# Patient Record
Sex: FEMALE | Race: WHITE | Employment: OTHER | URBAN - METROPOLITAN AREA
[De-identification: names, ages, dates, MRNs, and addresses within clinical notes are randomized per-mention and may not be internally consistent; named-entity substitution may affect disease eponyms.]

---

## 2018-02-16 ENCOUNTER — ANESTHESIA (OUTPATIENT)
Dept: ENDOSCOPY | Age: 83
End: 2018-02-16
Payer: MEDICARE

## 2018-02-16 ENCOUNTER — APPOINTMENT (OUTPATIENT)
Dept: GENERAL RADIOLOGY | Age: 83
End: 2018-02-16
Attending: EMERGENCY MEDICINE
Payer: MEDICARE

## 2018-02-16 ENCOUNTER — APPOINTMENT (OUTPATIENT)
Dept: CT IMAGING | Age: 83
End: 2018-02-16
Attending: EMERGENCY MEDICINE
Payer: MEDICARE

## 2018-02-16 ENCOUNTER — APPOINTMENT (OUTPATIENT)
Dept: GENERAL RADIOLOGY | Age: 83
End: 2018-02-16
Attending: INTERNAL MEDICINE
Payer: MEDICARE

## 2018-02-16 ENCOUNTER — ANESTHESIA EVENT (OUTPATIENT)
Dept: ENDOSCOPY | Age: 83
End: 2018-02-16
Payer: MEDICARE

## 2018-02-16 ENCOUNTER — HOSPITAL ENCOUNTER (OUTPATIENT)
Age: 83
Setting detail: OBSERVATION
Discharge: HOME | End: 2018-02-17
Attending: EMERGENCY MEDICINE | Admitting: INTERNAL MEDICINE
Payer: MEDICARE

## 2018-02-16 DIAGNOSIS — K56.2 SIGMOID VOLVULUS (HCC): Primary | ICD-10-CM

## 2018-02-16 LAB
ALBUMIN SERPL-MCNC: 3.5 G/DL (ref 3.2–4.6)
ALBUMIN/GLOB SERPL: 0.8 {RATIO} (ref 1.2–3.5)
ALP SERPL-CCNC: 73 U/L (ref 50–136)
ALT SERPL-CCNC: 34 U/L (ref 12–65)
ANION GAP SERPL CALC-SCNC: 9 MMOL/L (ref 7–16)
AST SERPL-CCNC: 42 U/L (ref 15–37)
ATRIAL RATE: 79 BPM
BASOPHILS # BLD: 0.1 K/UL (ref 0–0.2)
BASOPHILS NFR BLD: 1 % (ref 0–2)
BILIRUB SERPL-MCNC: 0.5 MG/DL (ref 0.2–1.1)
BUN SERPL-MCNC: 22 MG/DL (ref 8–23)
CALCIUM SERPL-MCNC: 9.2 MG/DL (ref 8.3–10.4)
CALCULATED P AXIS, ECG09: 60 DEGREES
CALCULATED R AXIS, ECG10: 51 DEGREES
CALCULATED T AXIS, ECG11: 12 DEGREES
CHLORIDE SERPL-SCNC: 103 MMOL/L (ref 98–107)
CO2 SERPL-SCNC: 26 MMOL/L (ref 21–32)
CREAT SERPL-MCNC: 0.9 MG/DL (ref 0.6–1)
DIAGNOSIS, 93000: NORMAL
DIFFERENTIAL METHOD BLD: ABNORMAL
EOSINOPHIL # BLD: 0.2 K/UL (ref 0–0.8)
EOSINOPHIL NFR BLD: 2 % (ref 0.5–7.8)
ERYTHROCYTE [DISTWIDTH] IN BLOOD BY AUTOMATED COUNT: 14.8 % (ref 11.9–14.6)
GLOBULIN SER CALC-MCNC: 4.3 G/DL (ref 2.3–3.5)
GLUCOSE SERPL-MCNC: 104 MG/DL (ref 65–100)
HCT VFR BLD AUTO: 39.9 % (ref 35.8–46.3)
HGB BLD-MCNC: 13.6 G/DL (ref 11.7–15.4)
IMM GRANULOCYTES # BLD: 0 K/UL (ref 0–0.5)
IMM GRANULOCYTES NFR BLD AUTO: 0 % (ref 0–5)
LIPASE SERPL-CCNC: 78 U/L (ref 73–393)
LYMPHOCYTES # BLD: 1.6 K/UL (ref 0.5–4.6)
LYMPHOCYTES NFR BLD: 16 % (ref 13–44)
MCH RBC QN AUTO: 32.3 PG (ref 26.1–32.9)
MCHC RBC AUTO-ENTMCNC: 34.1 G/DL (ref 31.4–35)
MCV RBC AUTO: 94.8 FL (ref 79.6–97.8)
MONOCYTES # BLD: 1.2 K/UL (ref 0.1–1.3)
MONOCYTES NFR BLD: 12 % (ref 4–12)
NEUTS SEG # BLD: 6.8 K/UL (ref 1.7–8.2)
NEUTS SEG NFR BLD: 69 % (ref 43–78)
P-R INTERVAL, ECG05: 220 MS
PLATELET # BLD AUTO: 181 K/UL (ref 150–450)
PMV BLD AUTO: 10.5 FL (ref 10.8–14.1)
POTASSIUM SERPL-SCNC: 5.3 MMOL/L (ref 3.5–5.1)
PROT SERPL-MCNC: 7.8 G/DL (ref 6.3–8.2)
Q-T INTERVAL, ECG07: 398 MS
QRS DURATION, ECG06: 70 MS
QTC CALCULATION (BEZET), ECG08: 456 MS
RBC # BLD AUTO: 4.21 M/UL (ref 4.05–5.25)
SODIUM SERPL-SCNC: 138 MMOL/L (ref 136–145)
TROPONIN I BLD-MCNC: 0.01 NG/ML (ref 0.02–0.05)
VENTRICULAR RATE, ECG03: 79 BPM
WBC # BLD AUTO: 9.9 K/UL (ref 4.3–11.1)

## 2018-02-16 PROCEDURE — 74011250636 HC RX REV CODE- 250/636

## 2018-02-16 PROCEDURE — 96361 HYDRATE IV INFUSION ADD-ON: CPT | Performed by: EMERGENCY MEDICINE

## 2018-02-16 PROCEDURE — 99218 HC RM OBSERVATION: CPT

## 2018-02-16 PROCEDURE — 74011250637 HC RX REV CODE- 250/637: Performed by: INTERNAL MEDICINE

## 2018-02-16 PROCEDURE — 96374 THER/PROPH/DIAG INJ IV PUSH: CPT | Performed by: EMERGENCY MEDICINE

## 2018-02-16 PROCEDURE — 74011000258 HC RX REV CODE- 258: Performed by: EMERGENCY MEDICINE

## 2018-02-16 PROCEDURE — 83690 ASSAY OF LIPASE: CPT | Performed by: EMERGENCY MEDICINE

## 2018-02-16 PROCEDURE — 74011250636 HC RX REV CODE- 250/636: Performed by: EMERGENCY MEDICINE

## 2018-02-16 PROCEDURE — 74011250636 HC RX REV CODE- 250/636: Performed by: INTERNAL MEDICINE

## 2018-02-16 PROCEDURE — 74177 CT ABD & PELVIS W/CONTRAST: CPT

## 2018-02-16 PROCEDURE — 76040000025: Performed by: INTERNAL MEDICINE

## 2018-02-16 PROCEDURE — 74011636320 HC RX REV CODE- 636/320: Performed by: EMERGENCY MEDICINE

## 2018-02-16 PROCEDURE — 80053 COMPREHEN METABOLIC PANEL: CPT | Performed by: EMERGENCY MEDICINE

## 2018-02-16 PROCEDURE — 74011000250 HC RX REV CODE- 250

## 2018-02-16 PROCEDURE — 96372 THER/PROPH/DIAG INJ SC/IM: CPT

## 2018-02-16 PROCEDURE — 85025 COMPLETE CBC W/AUTO DIFF WBC: CPT | Performed by: EMERGENCY MEDICINE

## 2018-02-16 PROCEDURE — 99283 EMERGENCY DEPT VISIT LOW MDM: CPT | Performed by: EMERGENCY MEDICINE

## 2018-02-16 PROCEDURE — 76060000032 HC ANESTHESIA 0.5 TO 1 HR: Performed by: INTERNAL MEDICINE

## 2018-02-16 PROCEDURE — 96375 TX/PRO/DX INJ NEW DRUG ADDON: CPT | Performed by: EMERGENCY MEDICINE

## 2018-02-16 PROCEDURE — 93005 ELECTROCARDIOGRAM TRACING: CPT | Performed by: EMERGENCY MEDICINE

## 2018-02-16 PROCEDURE — 96361 HYDRATE IV INFUSION ADD-ON: CPT

## 2018-02-16 PROCEDURE — 99284 EMERGENCY DEPT VISIT MOD MDM: CPT | Performed by: EMERGENCY MEDICINE

## 2018-02-16 PROCEDURE — 84484 ASSAY OF TROPONIN QUANT: CPT

## 2018-02-16 PROCEDURE — 74018 RADEX ABDOMEN 1 VIEW: CPT

## 2018-02-16 RX ORDER — FAMOTIDINE 20 MG/1
20 TABLET, FILM COATED ORAL DAILY
Status: DISCONTINUED | OUTPATIENT
Start: 2018-02-17 | End: 2018-02-17 | Stop reason: HOSPADM

## 2018-02-16 RX ORDER — SODIUM CHLORIDE 0.9 % (FLUSH) 0.9 %
5-10 SYRINGE (ML) INJECTION EVERY 8 HOURS
Status: DISCONTINUED | OUTPATIENT
Start: 2018-02-16 | End: 2018-02-17 | Stop reason: HOSPADM

## 2018-02-16 RX ORDER — PRAVASTATIN SODIUM 20 MG/1
20 TABLET ORAL
Status: DISCONTINUED | OUTPATIENT
Start: 2018-02-16 | End: 2018-02-17 | Stop reason: HOSPADM

## 2018-02-16 RX ORDER — DOCUSATE SODIUM 100 MG/1
100 CAPSULE, LIQUID FILLED ORAL
Status: DISCONTINUED | OUTPATIENT
Start: 2018-02-16 | End: 2018-02-17 | Stop reason: HOSPADM

## 2018-02-16 RX ORDER — TRAMADOL HYDROCHLORIDE 50 MG/1
25 TABLET ORAL
Status: DISCONTINUED | OUTPATIENT
Start: 2018-02-16 | End: 2018-02-17 | Stop reason: HOSPADM

## 2018-02-16 RX ORDER — ONDANSETRON 4 MG/1
4 TABLET, FILM COATED ORAL
COMMUNITY
End: 2018-03-14

## 2018-02-16 RX ORDER — SODIUM CHLORIDE 0.9 % (FLUSH) 0.9 %
5-10 SYRINGE (ML) INJECTION AS NEEDED
Status: DISCONTINUED | OUTPATIENT
Start: 2018-02-16 | End: 2018-02-17 | Stop reason: HOSPADM

## 2018-02-16 RX ORDER — DIVALPROEX SODIUM 125 MG/1
125 TABLET, DELAYED RELEASE ORAL
Status: DISCONTINUED | OUTPATIENT
Start: 2018-02-16 | End: 2018-02-17 | Stop reason: HOSPADM

## 2018-02-16 RX ORDER — SODIUM CHLORIDE 0.9 % (FLUSH) 0.9 %
10 SYRINGE (ML) INJECTION
Status: COMPLETED | OUTPATIENT
Start: 2018-02-16 | End: 2018-02-16

## 2018-02-16 RX ORDER — ENOXAPARIN SODIUM 100 MG/ML
40 INJECTION SUBCUTANEOUS EVERY 24 HOURS
Status: DISCONTINUED | OUTPATIENT
Start: 2018-02-16 | End: 2018-02-17 | Stop reason: HOSPADM

## 2018-02-16 RX ORDER — SODIUM CHLORIDE 9 MG/ML
75 INJECTION, SOLUTION INTRAVENOUS CONTINUOUS
Status: DISCONTINUED | OUTPATIENT
Start: 2018-02-16 | End: 2018-02-17 | Stop reason: HOSPADM

## 2018-02-16 RX ORDER — CITALOPRAM 20 MG/1
20 TABLET, FILM COATED ORAL EVERY EVENING
Status: DISCONTINUED | OUTPATIENT
Start: 2018-02-16 | End: 2018-02-17 | Stop reason: HOSPADM

## 2018-02-16 RX ORDER — DIVALPROEX SODIUM 125 MG/1
125 TABLET, DELAYED RELEASE ORAL
COMMUNITY

## 2018-02-16 RX ORDER — HYDROCODONE BITARTRATE AND ACETAMINOPHEN 5; 325 MG/1; MG/1
1 TABLET ORAL
Status: DISCONTINUED | OUTPATIENT
Start: 2018-02-16 | End: 2018-02-17 | Stop reason: HOSPADM

## 2018-02-16 RX ORDER — SODIUM CHLORIDE 9 MG/ML
125 INJECTION, SOLUTION INTRAVENOUS CONTINUOUS
Status: DISCONTINUED | OUTPATIENT
Start: 2018-02-16 | End: 2018-02-16

## 2018-02-16 RX ORDER — MEMANTINE HYDROCHLORIDE 5 MG/1
5 TABLET ORAL 2 TIMES DAILY
Status: DISCONTINUED | OUTPATIENT
Start: 2018-02-16 | End: 2018-02-17 | Stop reason: HOSPADM

## 2018-02-16 RX ORDER — MEMANTINE HYDROCHLORIDE 5 MG/1
5 TABLET ORAL 2 TIMES DAILY
COMMUNITY

## 2018-02-16 RX ORDER — DIVALPROEX SODIUM 125 MG/1
125 TABLET, DELAYED RELEASE ORAL DAILY
COMMUNITY
End: 2018-03-14

## 2018-02-16 RX ORDER — LORAZEPAM 0.5 MG/1
0.5 TABLET ORAL
COMMUNITY

## 2018-02-16 RX ORDER — ASPIRIN 81 MG/1
81 TABLET ORAL DAILY
Status: DISCONTINUED | OUTPATIENT
Start: 2018-02-17 | End: 2018-02-17 | Stop reason: HOSPADM

## 2018-02-16 RX ORDER — HYDRALAZINE HYDROCHLORIDE 20 MG/ML
10 INJECTION INTRAMUSCULAR; INTRAVENOUS
Status: COMPLETED | OUTPATIENT
Start: 2018-02-16 | End: 2018-02-16

## 2018-02-16 RX ORDER — TRAMADOL HYDROCHLORIDE 50 MG/1
25 TABLET ORAL
COMMUNITY

## 2018-02-16 RX ORDER — ONDANSETRON 2 MG/ML
INJECTION INTRAMUSCULAR; INTRAVENOUS
Status: DISCONTINUED
Start: 2018-02-16 | End: 2018-02-16

## 2018-02-16 RX ORDER — ACETAMINOPHEN 325 MG/1
650 TABLET ORAL
Status: DISCONTINUED | OUTPATIENT
Start: 2018-02-16 | End: 2018-02-17 | Stop reason: HOSPADM

## 2018-02-16 RX ORDER — CITALOPRAM 20 MG/1
20 TABLET, FILM COATED ORAL EVERY EVENING
COMMUNITY

## 2018-02-16 RX ORDER — DIVALPROEX SODIUM 125 MG/1
125 TABLET, DELAYED RELEASE ORAL DAILY
Status: DISCONTINUED | OUTPATIENT
Start: 2018-02-17 | End: 2018-02-17 | Stop reason: HOSPADM

## 2018-02-16 RX ORDER — LORAZEPAM 0.5 MG/1
0.5 TABLET ORAL
Status: DISCONTINUED | OUTPATIENT
Start: 2018-02-16 | End: 2018-02-17 | Stop reason: HOSPADM

## 2018-02-16 RX ORDER — LISINOPRIL 5 MG/1
10 TABLET ORAL DAILY
Status: DISCONTINUED | OUTPATIENT
Start: 2018-02-17 | End: 2018-02-17 | Stop reason: HOSPADM

## 2018-02-16 RX ORDER — FUROSEMIDE 20 MG/1
30 TABLET ORAL DAILY
COMMUNITY

## 2018-02-16 RX ORDER — SODIUM CHLORIDE, SODIUM LACTATE, POTASSIUM CHLORIDE, CALCIUM CHLORIDE 600; 310; 30; 20 MG/100ML; MG/100ML; MG/100ML; MG/100ML
INJECTION, SOLUTION INTRAVENOUS
Status: DISCONTINUED | OUTPATIENT
Start: 2018-02-16 | End: 2018-02-16 | Stop reason: HOSPADM

## 2018-02-16 RX ORDER — PROCHLORPERAZINE EDISYLATE 5 MG/ML
10 INJECTION INTRAMUSCULAR; INTRAVENOUS
Status: COMPLETED | OUTPATIENT
Start: 2018-02-16 | End: 2018-02-16

## 2018-02-16 RX ORDER — DIPHENHYDRAMINE HYDROCHLORIDE 50 MG/ML
12.5 INJECTION, SOLUTION INTRAMUSCULAR; INTRAVENOUS
Status: COMPLETED | OUTPATIENT
Start: 2018-02-16 | End: 2018-02-16

## 2018-02-16 RX ORDER — ONDANSETRON 2 MG/ML
4 INJECTION INTRAMUSCULAR; INTRAVENOUS
Status: COMPLETED | OUTPATIENT
Start: 2018-02-16 | End: 2018-02-16

## 2018-02-16 RX ORDER — DIPYRIDAMOLE 50 MG
50 TABLET ORAL 2 TIMES DAILY
Status: DISCONTINUED | OUTPATIENT
Start: 2018-02-16 | End: 2018-02-17 | Stop reason: HOSPADM

## 2018-02-16 RX ORDER — LIDOCAINE HYDROCHLORIDE 20 MG/ML
INJECTION, SOLUTION EPIDURAL; INFILTRATION; INTRACAUDAL; PERINEURAL AS NEEDED
Status: DISCONTINUED | OUTPATIENT
Start: 2018-02-16 | End: 2018-02-16 | Stop reason: HOSPADM

## 2018-02-16 RX ORDER — PROPOFOL 10 MG/ML
INJECTION, EMULSION INTRAVENOUS
Status: DISCONTINUED | OUTPATIENT
Start: 2018-02-16 | End: 2018-02-16 | Stop reason: HOSPADM

## 2018-02-16 RX ORDER — ONDANSETRON 2 MG/ML
4 INJECTION INTRAMUSCULAR; INTRAVENOUS
Status: DISCONTINUED | OUTPATIENT
Start: 2018-02-16 | End: 2018-02-17 | Stop reason: HOSPADM

## 2018-02-16 RX ADMIN — MEMANTINE HYDROCHLORIDE 5 MG: 5 TABLET ORAL at 17:09

## 2018-02-16 RX ADMIN — Medication 10 ML: at 03:14

## 2018-02-16 RX ADMIN — SODIUM CHLORIDE 75 ML/HR: 900 INJECTION, SOLUTION INTRAVENOUS at 23:47

## 2018-02-16 RX ADMIN — DIATRIZOATE MEGLUMINE AND DIATRIZOATE SODIUM 15 ML: 600; 100 SOLUTION ORAL; RECTAL at 02:06

## 2018-02-16 RX ADMIN — PROCHLORPERAZINE EDISYLATE 10 MG: 5 INJECTION INTRAMUSCULAR; INTRAVENOUS at 07:23

## 2018-02-16 RX ADMIN — IOPAMIDOL 100 ML: 755 INJECTION, SOLUTION INTRAVENOUS at 03:14

## 2018-02-16 RX ADMIN — Medication 10 ML: at 22:18

## 2018-02-16 RX ADMIN — ONDANSETRON 4 MG: 2 INJECTION INTRAMUSCULAR; INTRAVENOUS at 05:09

## 2018-02-16 RX ADMIN — LIDOCAINE HYDROCHLORIDE 60 MG: 20 INJECTION, SOLUTION EPIDURAL; INFILTRATION; INTRACAUDAL; PERINEURAL at 08:47

## 2018-02-16 RX ADMIN — Medication 10 ML: at 22:17

## 2018-02-16 RX ADMIN — DIPHENHYDRAMINE HYDROCHLORIDE 12.5 MG: 50 INJECTION, SOLUTION INTRAMUSCULAR; INTRAVENOUS at 07:23

## 2018-02-16 RX ADMIN — Medication 5 ML: at 02:06

## 2018-02-16 RX ADMIN — HYDRALAZINE HYDROCHLORIDE 10 MG: 20 INJECTION INTRAMUSCULAR; INTRAVENOUS at 02:52

## 2018-02-16 RX ADMIN — SODIUM CHLORIDE, SODIUM LACTATE, POTASSIUM CHLORIDE, CALCIUM CHLORIDE: 600; 310; 30; 20 INJECTION, SOLUTION INTRAVENOUS at 08:44

## 2018-02-16 RX ADMIN — SODIUM CHLORIDE 125 ML/HR: 900 INJECTION, SOLUTION INTRAVENOUS at 01:49

## 2018-02-16 RX ADMIN — DIPYRIDAMOLE 50 MG: 50 TABLET, FILM COATED ORAL at 17:10

## 2018-02-16 RX ADMIN — CITALOPRAM HYDROBROMIDE 20 MG: 20 TABLET ORAL at 17:09

## 2018-02-16 RX ADMIN — PRAVASTATIN SODIUM 20 MG: 20 TABLET ORAL at 22:15

## 2018-02-16 RX ADMIN — PROPOFOL 100 MCG/KG/MIN: 10 INJECTION, EMULSION INTRAVENOUS at 08:47

## 2018-02-16 RX ADMIN — SODIUM CHLORIDE 100 ML: 900 INJECTION, SOLUTION INTRAVENOUS at 03:14

## 2018-02-16 RX ADMIN — ENOXAPARIN SODIUM 40 MG: 40 INJECTION SUBCUTANEOUS at 17:09

## 2018-02-16 RX ADMIN — SODIUM CHLORIDE 75 ML/HR: 900 INJECTION, SOLUTION INTRAVENOUS at 11:11

## 2018-02-16 RX ADMIN — Medication 1 AMPULE: at 22:15

## 2018-02-16 NOTE — H&P
History & Physcial   NAME:  Brijesh Drew   Age:  80 y.o.  :   1935   MRN:   [id number]  PCP: Jay Kirkland MD  Treatment Team: Attending Provider: Zeeshan Reyes MD; Consulting Provider: Olivia Shields MD; Primary Nurse: Farrukh Holland  HPI:   Ms. Oswaldo Villarreal is a 81 yo female with PMH of CVA, GERD who presented to the ED with c/o abd pain. Pt family at bedside and reports pt lives in [place]. She had sudden onset of diffuse abd pain starting about 12 hours prior to presenting to the ED. CT abd showed sigmoid volvulus. GI consulted and pt underwent a sigmoidoscopy for decompression. During the procedure pt had runs of tachycardia. Also with continued abd distention but no pain. Results summary of Diagnostic Studies/Procedures copied from within [facility] EMR:   HISTORY:  abdominal pain, lower epigastric     COMPARISON: None     EXAM: CT abdomen and pelvis with iv contrast     TECHNIQUE:   Thin section axial CT was performed from the lung bases through the symphysis  pubis during uneventful rapid bolus intravenous administration of 125 mL of  Isovue 370. Oral contrast was also administered. Radiation dose reduction  techniques were used for this study. Our CT scanners use one or all of the  following: Automated exposure control, adjustment of the mA and/or kV according  to patient size, use of iterative reconstruction.     FINDINGS:     There is scarring, atelectasis present at the lung bases bilaterally. There is a  large Bochdalek hernia present.     CT abdomen: The liver and spleen enhances homogeneously without discrete  lesions. There is no biliary ductal dilatation. The gallbladder, pancreas and  adrenal glands are normal. The kidneys enhance symmetrically. Bowel loops in the  upper abdomen are normal. No definite upper abdominal lymphadenopathy seen.     CT pelvis: There is significant distention involving multiple loops of colon. There is a fluid-filled rectum.  There is a question of sigmoid fall [place]ag[place]'s. The  bladder and rectum are normal. No pelvic adenopathy seen. No free air or free  fluid seen within the pelvis.      Bone window evaluation demonstrates no aggressive osseous lesions.     IMPRESSION  IMPRESSION:     1. Multiple distended loops of colon. There is question of fall he was (image  64). There is a fluid-filled rectum. 2. Large Bochdalek hernia. 3. Scarring, atelectasis present at the lung bases bilaterally.         KUB Abdomen series 2/16/2018 12:41 PM     Indication: Abdomen pain     Comparison: CT abdomen and pelvis 2/16/2018     Findings: 2  supine views are submitted. The bladder is well-distended at this  time, filled with residual contrast from the CT study done earlier this morning. Administered oral contrast has progressed into the ascending colon but there  continues to be an appearance of nonfocal colonic distention similar to the  prior study  view. Overall the extent of distention of these bowel loops is  probably slightly decreased, especially in the descending colon. No gross free  air, no mass.     IMPRESSION  IMPRESSION:   1. Significant colonic distention, similar to before but probably with some  improvement on the left side. Oral contrast administered for this CT is  demonstrated to have progressed into the ascending colon. 2. Bladder is quite distended now showing excreted iodinated contrast within the lumen.                 Complete ROS done and is as stated in HPI or otherwise negative  Past Medical History:   Diagnosis Date    GERD (gastroesophageal reflux disease)     Other ill-defined conditions(799.89)     has frequent UTI's, polio    Polio     since age 3    Stroke ([facility] [address] 2011    no side effects - TIA      Past Surgical History:   Procedure Laterality Date    ABDOMEN SURGERY PROC UNLISTED      hernia    HX ADENOIDECTOMY      HX GI      hemorrhoidectomy    HX HYSTERECTOMY      HX ORTHOPAEDIC      right foot and leg x2    HX TONSILLECTOMY Allergies   Allergen Reactions    Amoxicillin Rash    Codeine Rash    Keflex [Cephalexin] Rash    Penicillins Unknown (comments)    Sulfa (Sulfonamide Antibiotics) Unknown (comments)      Social History   Substance Use Topics    Smoking status: Never Smoker    Smokeless tobacco: Never Used    Alcohol use No      Family History   Problem Relation Age of Onset    Heart Disease Mother     Lung Disease Father     Cancer Father         Objective:     Visit Vitals    /57    Pulse 86    Temp 96.8 °F (36 °C)    Resp 20    Ht 5' 6\" (1.676 m)    Wt 63.5 kg (140 lb)    SpO2 94%    BMI 22.6 kg/m2      Temp (24hrs), Av.6 °F (36.4 °C), Min:96.8 °F (36 °C), Max:98 °F (36.7 °C)    Oxygen Therapy  O2 Sat (%): 94 % (18 1009)  Pulse via Oximetry: 88 beats per minute (18 1009)  O2 Device: Room air (18 1009)  Physical Exam:  General:    Alert, cooperative, no distress, appears stated age. Head:   Normocephalic, without obvious abnormality, atraumatic. Nose:  Nares normal. No drainage or sinus tenderness. Lungs:   CTA, resp even and non labored. Heart:  S1S2 present with 3/6murmurs LUSB, no rubs gallops. RRR. No edema  Abdomen:   Soft, non-tender. Distended. Bowel sounds hypoactive LLQ. No masses  Extremities: No cyanosis. Moves ext spontaneously  Skin:     Texture, turgor normal. No rashes or lesions. Not Jaundiced  Neurologic: Alert and oriented X4. No focal deficits      Data Review:   Recent Results (from the past 24 hour(s))   EKG, 12 LEAD, INITIAL    Collection Time: 18  1:37 AM   Result Value Ref Range    Ventricular Rate 79 BPM    Atrial Rate 79 BPM    P-R Interval 220 ms    QRS Duration 70 ms    Q-T Interval 398 ms    QTC Calculation (Bezet) 456 ms    Calculated P Axis 60 degrees    Calculated R Axis 51 degrees    Calculated T Axis 12 degrees    Diagnosis       !! AGE AND GENDER SPECIFIC ECG ANALYSIS !!   Sinus rhythm with 1st degree A-V block with occasional Premature ventricular   complexes and Possible Premature atrial  complexes with Aberrant conduction  Cannot rule out Anterior infarct (cited on or before 11-MAY-2015)  Abnormal ECG  When compared with ECG of 11-MAY-2015 12:34,  Significant changes have occurred  Confirmed by Meagan Espinosa ([id number]) on 2/16/2018 7:01:51 AM     CBC WITH AUTOMATED DIFF    Collection Time: 02/16/18  1:46 AM   Result Value Ref Range    WBC 9.9 4.3 - 11.1 K/uL    RBC 4.21 4.05 - 5.25 M/uL    HGB 13.6 11.7 - 15.4 g/dL    HCT 39.9 35.8 - 46.3 %    MCV 94.8 79.6 - 97.8 FL    MCH 32.3 26.1 - 32.9 PG    MCHC 34.1 31.4 - 35.0 g/dL    RDW 14.8 (H) 11.9 - 14.6 %    PLATELET 727 187 - 315 K/uL    MPV 10.5 (L) 10.8 - 14.1 FL    DF AUTOMATED      NEUTROPHILS 69 43 - 78 %    LYMPHOCYTES 16 13 - 44 %    MONOCYTES 12 4.0 - 12.0 %    EOSINOPHILS 2 0.5 - 7.8 %    BASOPHILS 1 0.0 - 2.0 %    IMMATURE GRANULOCYTES 0 0.0 - 5.0 %    ABS. NEUTROPHILS 6.8 1.7 - 8.2 K/UL    ABS. LYMPHOCYTES 1.6 0.5 - 4.6 K/UL    ABS. MONOCYTES 1.2 0.1 - 1.3 K/UL    ABS. EOSINOPHILS 0.2 0.0 - 0.8 K/UL    ABS. BASOPHILS 0.1 0.0 - 0.2 K/UL    ABS. IMM. GRANS. 0.0 0.0 - 0.5 K/UL   METABOLIC PANEL, COMPREHENSIVE    Collection Time: 02/16/18  1:46 AM   Result Value Ref Range    Sodium 138 136 - 145 mmol/L    Potassium 5.3 (H) 3.5 - 5.1 mmol/L    Chloride 103 98 - 107 mmol/L    CO2 26 21 - 32 mmol/L    Anion gap 9 7 - 16 mmol/L    Glucose 104 (H) 65 - 100 mg/dL    BUN 22 8 - 23 MG/DL    Creatinine 0.90 0.6 - 1.0 MG/DL    GFR est AA >60 >60 ml/min/1.73m2    GFR est non-AA >60 >60 ml/min/1.73m2    Calcium 9.2 8.3 - 10.4 MG/DL    Bilirubin, total 0.5 0.2 - 1.1 MG/DL    ALT (SGPT) 34 12 - 65 U/L    AST (SGOT) 42 (H) 15 - 37 U/L    Alk.  phosphatase 73 50 - 136 U/L    Protein, total 7.8 6.3 - 8.2 g/dL    Albumin 3.5 3.2 - 4.6 g/dL    Globulin 4.3 (H) 2.3 - 3.5 g/dL    A-G Ratio 0.8 (L) 1.2 - 3.5     LIPASE    Collection Time: 02/16/18  1:46 AM   Result Value Ref Range    Lipase 78 73 - 393 U/L   POC TROPONIN-I    Collection Time: 02/16/18  1:54 AM   Result Value Ref Range    Troponin-I (POC) 0.01 (L) 0.02 - 0.05 ng/ml       Assessment and Plan:      Active Hospital Problems    Diagnosis Date Noted    Volvulus of sigmoid colon ([address] 02/16/2018     Admit to Obs remote tele  Consult Surgery  IVF  Resume selected home meds  FU KUB  CBC, CMP in AM  Clear liquid diet      Notes, labs, VS, diagnostic testing reviewed  Time spent with pt 20 min  Case discussed with pt, care team, Dr. Aundrea Obrien, daughter at bedside  DVT prophylaxis:  lovenox  Code Full  Surrogate decision maker:  daughter  Estimated length of stay: 24-48 hours    Srinivasan Mooney NP

## 2018-02-16 NOTE — ANESTHESIA POSTPROCEDURE EVALUATION
Post-Anesthesia Evaluation and Assessment    Patient: Alejandrina Wilson MRN: [id number]  SSN: [id number]    YOB: 1935  Age: 80 y.o. Sex: female       Cardiovascular Function/Vital Signs  Visit Vitals    /53    Pulse 62    Temp 36 °C (96.8 °F)    Resp 18    Ht 5' 6\" (1.676 m)    Wt 63.5 kg (140 lb)    SpO2 95%    BMI 22.6 kg/m2       Patient is status post total IV anesthesia anesthesia for Procedure(s):  SIGMOIDOSCOPY FLEXIBLE  COLON DECOMPRESSION  ANOSCOPY. Nausea/Vomiting: None    Postoperative hydration reviewed and adequate. Pain:  Pain Scale 1: Numeric (0 - 10) (02/16/18 0926)  Pain Intensity 1: 0 (02/16/18 0926)   Managed    Neurological Status:   Neuro  Neurologic State: Alert (02/16/18 0154)  Orientation Level: Disoriented to time (02/16/18 0154)   At baseline    Mental Status and Level of Consciousness: Arousable    Pulmonary Status:   O2 Device: Room air (02/16/18 0926)   Adequate oxygenation and airway patent    Complications related to anesthesia: None    Post-anesthesia assessment completed.  No concerns    Signed By: Ramy Carmichael MD     February 16, 2018

## 2018-02-16 NOTE — IP AVS SNAPSHOT
[address] 
[phone] Patient: Skip Nieto MRN: [id number] :1935 About your hospitalization You were admitted on:  2018 You last received care in the:  [facility] 6 MED SURG You were discharged on:  2018 Why you were hospitalized Your primary diagnosis was:  Volvulus Of Sigmoid Colon (Hcc) Follow-up Information Follow up With Details Comments Contact Info [facility]  as scheduled [address] [place] [phone] Eric Aquino [phone] 
[phone] Discharge Orders None A check renetta indicates which time of day the medication should be taken. My Medications CONTINUE taking these medications Instructions Each Dose to Equal  
 Morning Noon Evening Bedtime  
 aspirin delayed-release 81 mg tablet Your next dose is:  Tomorrow Morning Take  by mouth daily. ATIVAN 0.5 mg tablet Generic drug:  LORazepam  
   
 Take 0.5 mg by mouth two (2) times daily as needed for Anxiety. 0.5 mg  
    
   
   
   
  
 bisacodyl 10 mg suppository Commonly known as:  DULCOLAX Insert 10 mg into rectum daily as needed. 10 mg  
    
   
   
   
  
 CeleXA 20 mg tablet Generic drug:  citalopram  
Your next dose is: This evening Take 20 mg by mouth every evening. 20 mg  
    
   
   
  
   
  
 COLACE 100 mg capsule Generic drug:  docusate sodium Take 100 mg by mouth two (2) times daily as needed. 100 mg  
    
   
   
   
  
 * DEPAKOTE 125 mg tablet Generic drug:  divalproex DR Your last dose was:   at 9am  
   
 Take 125 mg by mouth three (3) times daily as needed for Other (agitation). 125 mg  
    
   
   
   
  
 * DEPAKOTE 125 mg tablet Generic drug:  divalproex DR Your next dose is:  Tomorrow Morning Take 125 mg by mouth daily. 125 mg  
    
  
   
   
   
  
 dipyridamole 50 mg tablet Commonly known as:  PERSANTINE Your next dose is: This evening Take 50 mg by mouth two (2) times a day. Indications: Transient Cerebral Ischemia 50 mg  
    
  
   
   
  
   
  
 LASIX 20 mg tablet Generic drug:  furosemide Your next dose is:  Tomorrow Morning Take 30 mg by mouth daily. 30 mg  
    
  
   
   
   
  
 lidocaine 5 % topical cream  
   
 Apply  to affected area as directed. lisinopril 20 mg tablet Commonly known as:  Prince Kansky Your next dose is:  Tomorrow Morning Take 10 mg by mouth daily. 10 mg  
    
  
   
   
   
  
 MUCINEX 1,200 mg Ta12 ER tablet Generic drug:  guaiFENesin Your next dose is: This evening Take 1,200 mg by mouth two (2) times a day. 1200 mg NAMENDA 5 mg tablet Generic drug:  memantine Your next dose is: This evening Take 5 mg by mouth two (2) times a day. 5 mg  
    
  
   
   
  
   
  
 pravastatin 10 mg tablet Commonly known as:  PRAVACHOL Your next dose is: Take tonight Take 20 mg by mouth nightly. 20 mg  
    
   
   
   
  
  
 raNITIdine 150 mg tablet Commonly known as:  ZANTAC Your next dose is: This evening Take 150 mg by mouth two (2) times a day. 150 mg ULTRAM 50 mg tablet Generic drug:  traMADol Take 25 mg by mouth every six (6) hours as needed for Pain. 25 mg  
    
   
   
   
  
 ZOFRAN (AS HYDROCHLORIDE) 4 mg tablet Generic drug:  ondansetron hcl Take 4 mg by mouth every eight (8) hours as needed for Nausea. 4 mg ZyrTEC 10 mg Cap Generic drug:  Cetirizine Your next dose is: Take tonight Take 10 mg by mouth nightly. 10 mg  
    
   
   
   
  
  
 * Notice:   This list has 2 medication(s) that are the same as other medications prescribed for you. Read the directions carefully, and ask your doctor or other care provider to review them with you. STOP taking these medications   
 nitrofurantoin 100 mg Cap Discharge Instructions Bowel Blockage (Intestinal Obstruction): Care Instructions Your Care Instructions A bowel blockage, also called an intestinal obstruction, can prevent gas, fluids, or solids from moving through the intestines normally. It can cause constipation and, rarely, diarrhea. You may have pain, nausea, vomiting, and cramping. Most of the time, complete blockages require a stay in the hospital and possibly surgery. But if your bowel is only partly blocked, your doctor may tell you to wait until it clears on its own and you are able to pass gas and stool. If so, there are things you can do at home to help make you feel better. If you have had surgery for a bowel blockage, there are things you can do at home to make sure you heal well. You can also make some changes to keep your bowel from becoming blocked again. Follow-up care is a key part of your treatment and safety. Be sure to make and go to all appointments, and call your doctor if you are having problems. It's also a good idea to know your test results and keep a list of the medicines you take. How can you care for yourself at home? If your doctor has told you to wait at home for a blockage to clear on its own: · Follow your doctor's instructions. These may include eating a liquid diet to avoid complete blockage. · Be safe with medicines. Take your medicines exactly as prescribed. Call your doctor if you think you are having a problem with your medicine. · Put a heating pad set on low on your belly to relieve mild cramps and pain. To prevent another blockage · Try to eat smaller amounts of food more often. For example, have 5 or 6 small meals throughout the day instead of 2 or 3 large meals. · Chew your food very well. Try to chew each bite about 20 times or until it is liquid. · Avoid high-fiber foods and raw fruits and vegetables with skins, husks, strings, or seeds. These can form a ball of undigested material that can cause a blockage if a part of your bowel is scarred or narrowed. · Check with your doctor before you eat whole-grain products or use a fiber supplement such as Citrucel or Metamucil. · To help you have regular bowel movements, eat at regular times, do not strain during a bowel movement, and drink at least 8 to 10 glasses of water each day. If you have kidney, heart, or liver disease and have to limit fluids, talk with your doctor or before you increase the amount of fluids you drink. · Drink high-calorie liquid formulas if your doctor says to. Severe symptoms may make it hard for your body to take in vitamins and minerals. · Get regular exercise. It helps you digest your food better. Get at least 30 minutes of physical activity on most days of the week. Walking is a good choice. When should you call for help? Call your doctor now or seek immediate medical care if: 
? · You have a fever. ? · You are vomiting. ? · You have new or worse belly pain. ? · You cannot pass stools or gas. ? Watch closely for changes in your health, and be sure to contact your doctor if you have any problems. Where can you learn more? Go to http://israel-stuart.info/. Enter T440 in the search box to learn more about \"Bowel Blockage (Intestinal Obstruction): Care Instructions. \" Current as of: May 12, 2017 Content Version: 11.4 © 3842-3417 [employer]. Care instructions adapted under license by [employer] (which disclaims liability or warranty for this information).  If you have questions about a medical condition or this instruction, always ask your healthcare professional. [employer] disclaims any warranty or liability for your use of this information. DISCHARGE SUMMARY from Nurse PATIENT INSTRUCTIONS: 
 
After general anesthesia or intravenous sedation, for 24 hours or while taking prescription Narcotics: · Limit your activities · Do not drive and operate hazardous machinery · Do not make important personal or business decisions · Do  not drink alcoholic beverages · If you have not urinated within 8 hours after discharge, please contact your surgeon on call. Report the following to your surgeon: 
· Excessive pain, swelling, redness or odor of or around the surgical area · Temperature over 100.5 · Nausea and vomiting lasting longer than 4 hours or if unable to take medications · Any signs of decreased circulation or nerve impairment to extremity: change in color, persistent  numbness, tingling, coldness or increase pain · Any questions What to do at Home: *  Please give a list of your current medications to your Primary Care Provider. *  Please update this list whenever your medications are discontinued, doses are 
    changed, or new medications (including over-the-counter products) are added. *  Please carry medication information at all times in case of emergency situations. These are general instructions for a healthy lifestyle: No smoking/ No tobacco products/ Avoid exposure to second hand smoke Surgeon General's Warning:  Quitting smoking now greatly reduces serious risk to your health. Obesity, smoking, and sedentary lifestyle greatly increases your risk for illness A healthy diet, regular physical exercise & weight monitoring are important for maintaining a healthy lifestyle You may be retaining fluid if you have a history of heart failure or if you experience any of the following symptoms:  Weight gain of 3 pounds or more overnight or 5 pounds in a week, increased swelling in our hands or feet or shortness of breath while lying flat in bed. Please call your doctor as soon as you notice any of these symptoms; do not wait until your next office visit. Recognize signs and symptoms of STROKE: 
 
F-face looks uneven A-arms unable to move or move unevenly S-speech slurred or non-existent T-time-call 911 as soon as signs and symptoms begin-DO NOT go Back to bed or wait to see if you get better-TIME IS BRAIN. Warning Signs of HEART ATTACK Call 911 if you have these symptoms: 
? Chest discomfort. Most heart attacks involve discomfort in the center of the chest that lasts more than a few minutes, or that goes away and comes back. It can feel like uncomfortable pressure, squeezing, fullness, or pain. ? Discomfort in other areas of the upper body. Symptoms can include pain or discomfort in one or both arms, the back, neck, jaw, or stomach. ? Shortness of breath with or without chest discomfort. ? Other signs may include breaking out in a cold sweat, nausea, or lightheadedness. Don't wait more than five minutes to call [address]! Fast action can save your life. Calling 911 is almost always the fastest way to get lifesaving treatment. Emergency Medical Services staff can begin treatment when they arrive  up to an hour sooner than if someone gets to the hospital by car. The discharge information has been reviewed with the patient. The patient verbalized understanding. Discharge medications reviewed with the patient and appropriate educational materials and side effects teaching were provided. ___________________________________________________________________________________________________________________________________ MyChart Announcement We are excited to announce that we are making your provider's discharge notes available to you in [employer].   You will see these notes when they are completed and signed by the physician that discharged you from your recent hospital stay. If you have any questions or concerns about any information you see in Mamapedia, please call the Health Information Department where you were seen or reach out to your Primary Care Provider for more information about your plan of care. Introducing [facility]! [employer] introduces Mamapedia patient portal. Now you can access parts of your medical record, email your doctor's office, and request medication refills online. 1. In your internet browser, go to [email or web address] 2. Click on the First Time User? Click Here link in the Sign In box. You will see the New Member Sign Up page. 3. Enter your Mamapedia Access Code exactly as it appears below. You will not need to use this code after youve completed the sign-up process. If you do not sign up before the expiration date, you must request a new code. · Mamapedia Access Code: [email or web address] Expires: 5/17/2018  1:34 AM 
 
4. Enter the last four digits of your Social Security Number (xxxx) and Date of Birth (mm/dd/yyyy) as indicated and click Submit. You will be taken to the next sign-up page. 5. Create a Mamapedia ID. This will be your Mamapedia login ID and cannot be changed, so think of one that is secure and easy to remember. 6. Create a Mamapedia password. You can change your password at any time. 7. Enter your Password Reset Question and Answer. This can be used at a later time if you forget your password. 8. Enter your e-mail address. You will receive e-mail notification when new information is available in [address] 9. Click Sign Up. You can now view and download portions of your medical record. 10. Click the Download Summary menu link to download a portable copy of your medical information.  
 
If you have questions, please visit the Frequently Asked Questions section of the [employer]. Remember, MyChart is NOT to be used for urgent needs. For medical emergencies, dial 911. Now available from your iPhone and Android! Providers Seen During Your Hospitalization Provider Specialty Primary office phone Sweta Alonzo MD Emergency Medicine [phone] Zoe Zaldivar MD Emergency Medicine [phone] Kait Apple MD Gastroenterology [phone] Penny Jeffery MD Emergency Medicine [phone] Vanna Lyons MD Internal Medicine [phone] Your Primary Care Physician (PCP) Primary Care Physician Office Phone Office Fax OTHER, PHYS ** None ** ** None ** You are allergic to the following Allergen Reactions Amoxicillin Rash Codeine Rash Keflex (Cephalexin) Rash Penicillins Unknown (comments) Sulfa (Sulfonamide Antibiotics) Unknown (comments) Recent Documentation Height Weight BMI OB Status Smoking Status 1.676 m 63.5 kg 22.6 kg/m2 Hysterectomy Never Smoker Emergency Contacts Name Discharge Info Relation Home Work Mobile Richy Howard  Spouse [3] [phone] Monica Howard  Child [2] [phone] Ya Prado  Child [2] [phone] Patient Belongings The following personal items are in your possession at time of discharge: 
     Visual Aid: Glasses          Jewelry: Ring, Bracelet, Necklace Please provide this summary of care documentation to your next provider. Signatures-by signing, you are acknowledging that this After Visit Summary has been reviewed with you and you have received a copy. Patient Signature:  ____________________________________________________________ Date:  ____________________________________________________________  
  
Phyliss Ghee Provider Signature:  ____________________________________________________________ Date:  ____________________________________________________________

## 2018-02-16 NOTE — PROGRESS NOTES
Dr. Varinder Harley spoke with ER doctor stating patient was to be transferred back to ED for further evaluation. Awaiting call back from ED charge RN to give report.

## 2018-02-16 NOTE — IP AVS SNAPSHOT
Summary of Care Report The Summary of Care report has been created to help improve care coordination. Users with access to [employer] or [address] (Web-based application) may access additional patient information including the Discharge Summary. If you are not currently a [address] user and need more information, please call the number listed below in the [place] [address] section and ask to be connected with Medical Records. Facility Information Name Address Phone [address] [address] [address] [phone] Patient Information Patient Name Sex SURY Albarran ([id number]) Female 1935 Discharge Information Admitting Provider Service Area Unit Jeanenne Sever, MD / [address] Med Surg / [phone] Discharge Provider Discharge Date/Time Discharge Disposition Destination (none) 2018 (Pending) AHR (none) Patient Language Language ENGLISH [13] Hospital Problems as of 2018  Never Reviewed Class Noted - Resolved Last Modified POA Active Problems * (Principal)Volvulus of sigmoid colon ([facility] [address]  2018 - Present 2018 by Claribel Chan MD Yes Entered by Jeanenne Sever, MD  
  
Non-Hospital Problems as of 2018  Never Reviewed Class Noted - Resolved Last Modified Active Problems Hyponatremia  2012 - Present 2012 Entered by Terrell Valentine MD  
  Hypokalemia  2012 - Present 2012 Entered by Terrell Valentine MD  
  Acute encephalopathy  2012 - Present 2012 Entered by Terrell Valentine MD  
  Pyuria  2012 - Present 2012 Entered by Terrell Valentine MD  
  Polio (Chronic)  2012 - Present 2012   Entered by Terrell Valentine MD  
 Esophageal reflux  7/24/2012 - Present 7/26/2012 Entered by Alley Welch MD  
  Weakness generalized  7/24/2012 - Present 7/26/2012 Entered by Alley Welch MD  
  
You are allergic to the following Allergen Reactions Amoxicillin Rash Codeine Rash Keflex (Cephalexin) Rash Penicillins Unknown (comments) Sulfa (Sulfonamide Antibiotics) Unknown (comments) Current Discharge Medication List  
  
CONTINUE these medications which have NOT CHANGED Dose & Instructions Dispensing Information Comments  
 aspirin delayed-release 81 mg tablet Take  by mouth daily. Refills:  0  
   
 ATIVAN 0.5 mg tablet Generic drug:  LORazepam  
 Dose:  0.5 mg Take 0.5 mg by mouth two (2) times daily as needed for Anxiety. Refills:  0  
   
 bisacodyl 10 mg suppository Commonly known as:  DULCOLAX Dose:  10 mg Insert 10 mg into rectum daily as needed. Refills:  0 CeleXA 20 mg tablet Generic drug:  citalopram  
 Dose:  20 mg Take 20 mg by mouth every evening. Refills:  0  
   
 COLACE 100 mg capsule Generic drug:  docusate sodium Dose:  100 mg Take 100 mg by mouth two (2) times daily as needed. Refills:  0  
   
 * DEPAKOTE 125 mg tablet Generic drug:  divalproex DR Dose:  125 mg Take 125 mg by mouth three (3) times daily as needed for Other (agitation). Refills:  0  
   
 * DEPAKOTE 125 mg tablet Generic drug:  divalproex DR Dose:  125 mg Take 125 mg by mouth daily. Refills:  0  
   
 dipyridamole 50 mg tablet Commonly known as:  PERSANTINE Dose:  50 mg Take 50 mg by mouth two (2) times a day. Indications: Transient Cerebral Ischemia Refills:  0  
   
 LASIX 20 mg tablet Generic drug:  furosemide Dose:  30 mg Take 30 mg by mouth daily. Refills:  0  
   
 lidocaine 5 % topical cream  
 Apply  to affected area as directed. Refills:  0  
   
 lisinopril 20 mg tablet Commonly known as:  Sheldon Lovings Dose:  10 mg Take 10 mg by mouth daily. Refills:  0 MUCINEX 1,200 mg Ta12 ER tablet Generic drug:  guaiFENesin Dose:  1200 mg Take 1,200 mg by mouth two (2) times a day. Refills:  0  
   
 NAMENDA 5 mg tablet Generic drug:  memantine Dose:  5 mg Take 5 mg by mouth two (2) times a day. Refills:  0  
   
 pravastatin 10 mg tablet Commonly known as:  PRAVACHOL Dose:  20 mg Take 20 mg by mouth nightly. Refills:  0  
   
 raNITIdine 150 mg tablet Commonly known as:  ZANTAC Dose:  150 mg Take 150 mg by mouth two (2) times a day. Refills:  0  
   
 ULTRAM 50 mg tablet Generic drug:  traMADol Dose:  25 mg Take 25 mg by mouth every six (6) hours as needed for Pain. Refills:  0 ZOFRAN (AS HYDROCHLORIDE) 4 mg tablet Generic drug:  ondansetron hcl Dose:  4 mg Take 4 mg by mouth every eight (8) hours as needed for Nausea. Refills:  0 ZyrTEC 10 mg Cap Generic drug:  Cetirizine Dose:  10 mg Take 10 mg by mouth nightly. Refills:  0  
   
 * Notice: This list has 2 medication(s) that are the same as other medications prescribed for you. Read the directions carefully, and ask your doctor or other care provider to review them with you. STOP taking these medications Comments  
 nitrofurantoin 100 mg Cap Current Immunizations Name Date ZZZ-RETIRED (DO NOT USE) Pneumococcal Vaccine (Unspecified Type) 7/25/2008 Surgery Information ID Date/Time Status Primary Surgeon All Procedures Location [id number] 2/16/2018  8:30 AM Unposted Zack Gagnon MD SIGMOIDOSCOPY FLEXIBLE 
COLON DECOMPRESSION 
ANOSCOPY [facility] ENDOSCOPY Follow-up Information Follow up With Details Comments Contact Info [facility]  as scheduled [address] [place] [address] [id number] [address] [id number] [phone] Discharge Instructions Bowel Blockage (Intestinal Obstruction): Care Instructions Your Care Instructions A bowel blockage, also called an intestinal obstruction, can prevent gas, fluids, or solids from moving through the intestines normally. It can cause constipation and, rarely, diarrhea. You may have pain, nausea, vomiting, and cramping. Most of the time, complete blockages require a stay in the hospital and possibly surgery. But if your bowel is only partly blocked, your doctor may tell you to wait until it clears on its own and you are able to pass gas and stool. If so, there are things you can do at home to help make you feel better. If you have had surgery for a bowel blockage, there are things you can do at home to make sure you heal well. You can also make some changes to keep your bowel from becoming blocked again. Follow-up care is a key part of your treatment and safety. Be sure to make and go to all appointments, and call your doctor if you are having problems. It's also a good idea to know your test results and keep a list of the medicines you take. How can you care for yourself at home? If your doctor has told you to wait at home for a blockage to clear on its own: · Follow your doctor's instructions. These may include eating a liquid diet to avoid complete blockage. · Be safe with medicines. Take your medicines exactly as prescribed. Call your doctor if you think you are having a problem with your medicine. · Put a heating pad set on low on your belly to relieve mild cramps and pain. To prevent another blockage · Try to eat smaller amounts of food more often. For example, have 5 or 6 small meals throughout the day instead of 2 or 3 large meals. · Chew your food very well. Try to chew each bite about 20 times or until it is liquid. · Avoid high-fiber foods and raw fruits and vegetables with skins, husks, strings, or seeds.  These can form a ball of undigested material that can cause a blockage if a part of your bowel is scarred or narrowed. · Check with your doctor before you eat whole-grain products or use a fiber supplement such as Citrucel or Metamucil. · To help you have regular bowel movements, eat at regular times, do not strain during a bowel movement, and drink at least 8 to 10 glasses of water each day. If you have kidney, heart, or liver disease and have to limit fluids, talk with your doctor or before you increase the amount of fluids you drink. · Drink high-calorie liquid formulas if your doctor says to. Severe symptoms may make it hard for your body to take in vitamins and minerals. · Get regular exercise. It helps you digest your food better. Get at least 30 minutes of physical activity on most days of the week. Walking is a good choice. When should you call for help? Call your doctor now or seek immediate medical care if: 
? · You have a fever. ? · You are vomiting. ? · You have new or worse belly pain. ? · You cannot pass stools or gas. ? Watch closely for changes in your health, and be sure to contact your doctor if you have any problems. Where can you learn more? Go to http://israel-stuart.info/. Enter G411 in the search box to learn more about \"Bowel Blockage (Intestinal Obstruction): Care Instructions. \" Current as of: May 12, 2017 Content Version: 11.4 © 0956-3759 [employer]. Care instructions adapted under license by [employer] (which disclaims liability or warranty for this information). If you have questions about a medical condition or this instruction, always ask your healthcare professional. [place] [address] any warranty or liability for your use of this information. DISCHARGE SUMMARY from Nurse PATIENT INSTRUCTIONS: 
 
After general anesthesia or intravenous sedation, for 24 hours or while taking prescription Narcotics: · Limit your activities · Do not drive and operate hazardous machinery · Do not make important personal or business decisions · Do  not drink alcoholic beverages · If you have not urinated within 8 hours after discharge, please contact your surgeon on call. Report the following to your surgeon: 
· Excessive pain, swelling, redness or odor of or around the surgical area · Temperature over 100.5 · Nausea and vomiting lasting longer than 4 hours or if unable to take medications · Any signs of decreased circulation or nerve impairment to extremity: change in color, persistent  numbness, tingling, coldness or increase pain · Any questions What to do at Home: *  Please give a list of your current medications to your Primary Care Provider. *  Please update this list whenever your medications are discontinued, doses are 
    changed, or new medications (including over-the-counter products) are added. *  Please carry medication information at all times in case of emergency situations. These are general instructions for a healthy lifestyle: No smoking/ No tobacco products/ Avoid exposure to second hand smoke Surgeon General's Warning:  Quitting smoking now greatly reduces serious risk to your health. Obesity, smoking, and sedentary lifestyle greatly increases your risk for illness A healthy diet, regular physical exercise & weight monitoring are important for maintaining a healthy lifestyle You may be retaining fluid if you have a history of heart failure or if you experience any of the following symptoms:  Weight gain of 3 pounds or more overnight or 5 pounds in a week, increased swelling in our hands or feet or shortness of breath while lying flat in bed. Please call your doctor as soon as you notice any of these symptoms; do not wait until your next office visit. Recognize signs and symptoms of STROKE: 
 
F-face looks uneven A-arms unable to move or move unevenly S-speech slurred or non-existent T-time-call 911 as soon as signs and symptoms begin-DO NOT go Back to bed or wait to see if you get better-TIME IS BRAIN. Warning Signs of HEART ATTACK Call 911 if you have these symptoms: 
? Chest discomfort. Most heart attacks involve discomfort in the center of the chest that lasts more than a few minutes, or that goes away and comes back. It can feel like uncomfortable pressure, squeezing, fullness, or pain. ? Discomfort in other areas of the upper body. Symptoms can include pain or discomfort in one or both arms, the back, neck, jaw, or stomach. ? Shortness of breath with or without chest discomfort. ? Other signs may include breaking out in a cold sweat, nausea, or lightheadedness. Don't wait more than five minutes to call [address]! Fast action can save your life. Calling 911 is almost always the fastest way to get lifesaving treatment. Emergency Medical Services staff can begin treatment when they arrive  up to an hour sooner than if someone gets to the hospital by car. The discharge information has been reviewed with the patient. The patient verbalized understanding. Discharge medications reviewed with the patient and appropriate educational materials and side effects teaching were provided. ___________________________________________________________________________________________________________________________________ Chart Review Routing History Recipient Method Report Sent By Gianfranco Ye MD  
Fax: [phone] Phone: [phone] Fax IP Auto Routed Beatrice Squires MD [[id number]] 2/16/2018  3:58 PM 02/16/2018 Henrik Douglas MD  
Fax: [phone] Phone: [phone] Fax IP Auto Routed Beatrice Squires MD [[id number]] 2/16/2018  3:58 PM 02/16/2018

## 2018-02-16 NOTE — PROGRESS NOTES
Bed alarm on bed. Another Daughter at bedside. Patient walked with 2 person assist to bathroom. Patient had loose stool.

## 2018-02-16 NOTE — PROGRESS NOTES
Patient admitted to room 630. Daughter at bedside. Scratches noted top left shoulder. Redness noted to sacrum, Allevyn applied. No open skin noted to sacrum. Dual skin assessment with Kanika Gutierrez RN  Tele applied. Patient only oriented to name, not able to give birth date.

## 2018-02-16 NOTE — PROCEDURES
[facility] [address]  PROCEDURE NOTE    Fran Fernandez  MR#: [id number]  : 1935  ACCOUNT #: [de-identified]   DATE OF SERVICE: 2018    SUBJECTIVE:  An 80-year-old female is undergoing an urgent sigmoidoscopy after being seen in the ER because of abdominal distention. Questionable sigmoid volvulus noted on CT scan as well as a large Bochdalek hernia present. There are multiple distended loops of colon present. Sigmoidoscopy done today to decompress the colon. The patient has not been prepped. Risks (bleeding, perforation, infection, untoward cardiovascular or respiratory event, mortality), benefits and alternatives, discussed in detail with the patient who agrees to proceed. An irregular rhythm was noted prior to the endoscopy. ANESTHESIA:  Per MAC anesthesia. INSTRUMENT:  PCF-190 video colonoscope. PROCEDURE:  Digital rectal examination was performed which revealed a large amount of liquid stool. The pediatric colonoscope was inserted in the rectal vault. The colonoscope traversed and reached what appeared to be approximately 50 cm. At this point in time, because of formed debris, and a spastic colon, we simply could not proceed any further. The endoscope was removed from the area. I encountered a twist in the distal sigmoid colon. We traversed this twist without difficulty. There was a large amount of air that was suctioned as well as stool that was suctioned, air and stool suctioned to the rectosigmoid area. Following this, the abdomen was much less distended. The endoscope backed into the rectum where retroflexed view of the anal verge was not performed secondary to retained debris. The anoscope was inserted. There was large debris that was then evacuated from the rectal vault. I could not identify any anorectal abnormalities, but again the debris prevented a thorough examination of this area. The anoscope was removed from the patient.   The patient tolerated the procedure well and returned to the recovery area in stable condition. IMPRESSION:  1. Sigmoid volvulus decompressed with scope with much less abdominal distention noted following procedure. 2.  Retained debris. 3.  Study to 50 cm only because of retained debris and spasm. 4.  Irregular rhythm prior to the procedure. RECOMMENDATIONS:  DIAGNOSTIC:    1. Repeat films (followup films). 2.  Recommend Gastrografin enema and surgical consultation if necessary pending results of films. 3.  Surgical consultation regarding the patient's hernia.       MD WILI Camacho / JOSÉ LUIS  D: 02/16/2018 09:16     T: 02/16/2018 10:12  JOB #: [id number]  CC: Antonio Villarreal MD  CC: Giana Canela MD

## 2018-02-16 NOTE — PROGRESS NOTES
TRANSFER - IN REPORT:    Verbal report received from Isaiah Braswell(name) on Brijesh Manual  being received from ED(unit) for ordered procedure      Report consisted of patients Situation, Background, Assessment and   Recommendations(SBAR). Information from the following report(s) SBAR, Kardex and Procedure Summary was reviewed with the receiving nurse. Opportunity for questions and clarification was provided. Assessment completed upon patients arrival to unit and care assumed.

## 2018-02-16 NOTE — ANESTHESIA PREPROCEDURE EVALUATION
Anesthetic History               Review of Systems / Medical History  Patient summary reviewed    Pulmonary                   Neuro/Psych         Neuromuscular disease (Polio, uses walker), TIA and dementia     Cardiovascular                  Exercise tolerance: <4 METS: Due to fragility     GI/Hepatic/Renal     GERD           Endo/Other             Other Findings              Physical Exam    Airway  Mallampati: III  TM Distance: 4 - 6 cm  Neck ROM: decreased range of motion   Mouth opening: Diminished (comment)     Cardiovascular  Regular rate and rhythm,  S1 and S2 normal,  no murmur, click, rub, or gallop             Dental    Dentition: Upper partial plate     Pulmonary  Breath sounds clear to auscultation               Abdominal         Other Findings            Anesthetic Plan    ASA: 3, emergent  Anesthesia type: total IV anesthesia            Anesthetic plan and risks discussed with: Patient

## 2018-02-16 NOTE — ED TRIAGE NOTES
EMS states \"Patient lives at [facility] [address] near [facility] and staff states she started having chest pain around dinner time.   Patient states it was more like fluttering\"

## 2018-02-16 NOTE — PROGRESS NOTES
Date of Surgery Update:  Roark Peabody was seen and examined. History and physical has been reviewed. The patient has been examined. There have been no significant clinical changes since the completion of the originally dated History and Physical; however, cardiac exam reveals an irregular rhythm. We will proceed with a flex. Sigmoidoscopy. Risks (bleed, perforation, infection, untoward CV or resp. Event, mortality, etc.), benefits and alternatives were discussed with the patient and her daughter who agree to proceed.   Kael Dale MD      Signed By: Kael Dale MD     February 16, 2018 8:44 AM

## 2018-02-16 NOTE — ROUTINE PROCESS
TRANSFER - OUT REPORT:    Verbal report given to Tanvir(name) on Cory Lasso  being transferred to ER(unit) for ordered procedure       Report consisted of patients Situation, Background, Assessment and   Recommendations(SBAR). Information from the following report(s) SBAR was reviewed with the receiving nurse. Lines:   Peripheral IV 02/16/18 Left Wrist (Active)   Site Assessment Clean, dry, & intact 2/16/2018  9:48 AM   Phlebitis Assessment 0 2/16/2018  9:48 AM   Infiltration Assessment 0 2/16/2018  9:48 AM   Dressing Status Clean, dry, & intact 2/16/2018  9:48 AM   Dressing Type Tape;Transparent 2/16/2018  9:48 AM        Opportunity for questions and clarification was provided.       Patient transported with:   Monitor  Registered Nurse

## 2018-02-16 NOTE — CONSULTS
Gastroenterology Consultation Note          Date of consultation:  2/16/2018     Consulting physician:  Nieves Franco. Tee Veloz M.D. Chief complaint:  Abdominal pain    History of Present Illness:  Patient is a 80 y.o. female with h/o CVA and GERD, presenting with abdominal pain. Patient was brought from the nursing home with 12 hour history of diffuse aching pain of the abdomen. This came on abruptly last night and has been associated with severe distention and nausea. There has been no associated overt bleeding, recent weight loss of fevers. No prior similar episodes. In the ER, a CT suggests sigmoid volvulus. Of note, there has been fecal incontinence intermittently over the past few months. Patient denies heavy alcohol consumption, NSAIDs, or recent change in medications. No known prior colonoscopy according to patient and her daughter. PMH:  Past Medical History:   Diagnosis Date    GERD (gastroesophageal reflux disease)     Other ill-defined conditions(799.89)     has frequent UTI's, polio    Polio     since age 3    Stroke ([facility] [address] 2011    no side effects - TIA       PSH:  Past Surgical History:   Procedure Laterality Date    ABDOMEN SURGERY PROC UNLISTED      hernia    HX ADENOIDECTOMY      HX GI      hemorrhoidectomy    HX HYSTERECTOMY      HX ORTHOPAEDIC      right foot and leg x2    HX TONSILLECTOMY         Allergies: Allergies   Allergen Reactions    Amoxicillin Rash    Codeine Rash    Keflex [Cephalexin] Rash    Penicillins Unknown (comments)    Sulfa (Sulfonamide Antibiotics) Unknown (comments)       Home Medications:  Prior to Admission medications    Medication Sig Start Date End Date Taking? Authorizing Provider   aspirin delayed-release 81 mg tablet Take  by mouth daily. Nehemiah Fitzgerald MD   docusate sodium (COLACE) 100 mg capsule Take 100 mg by mouth two (2) times a day. Nehemiah Fitzgerald MD   lidocaine 5 % topical cream Apply  to affected area as directed.       Nehemiah Fitzgerald MD bisacodyl (DULCOLAX) 10 mg suppository Insert 10 mg into rectum daily as needed. Nehemiah Fitzgerald MD   carvedilol (COREG) 6.25 mg tablet Take 6.25 mg by mouth daily. Historical Provider   pravastatin (PRAVACHOL) 10 mg tablet Take 10 mg by mouth nightly. Historical Provider   lisinopril (PRINIVIL, ZESTRIL) 20 mg tablet Take 20 mg by mouth daily. Historical Provider   nitrofurantoin 100 mg Cap Take 1 Cap by mouth daily. Historical Provider   ranitidine (ZANTAC) 150 mg tablet Take 150 mg by mouth nightly. Historical Provider   dipyridamole (PERSANTINE) 50 mg tablet Take 75 mg by mouth two (2) times a day. Indications: TRANSIENT CEREBRAL ISCHEMIA    Historical Provider   Cetirizine (ZYRTEC) 10 mg Cap Take 10 mg by mouth nightly. Historical Provider       Hospital Medications:  Current Facility-Administered Medications   Medication Dose Route Frequency    sodium chloride (NS) flush 5-10 mL  5-10 mL IntraVENous Q8H    sodium chloride (NS) flush 5-10 mL  5-10 mL IntraVENous PRN    0.9% sodium chloride infusion  125 mL/hr IntraVENous CONTINUOUS     Current Outpatient Prescriptions   Medication Sig    aspirin delayed-release 81 mg tablet Take  by mouth daily.  docusate sodium (COLACE) 100 mg capsule Take 100 mg by mouth two (2) times a day.  lidocaine 5 % topical cream Apply  to affected area as directed.  bisacodyl (DULCOLAX) 10 mg suppository Insert 10 mg into rectum daily as needed.  carvedilol (COREG) 6.25 mg tablet Take 6.25 mg by mouth daily.  pravastatin (PRAVACHOL) 10 mg tablet Take 10 mg by mouth nightly.  lisinopril (PRINIVIL, ZESTRIL) 20 mg tablet Take 20 mg by mouth daily.  nitrofurantoin 100 mg Cap Take 1 Cap by mouth daily.  ranitidine (ZANTAC) 150 mg tablet Take 150 mg by mouth nightly.  dipyridamole (PERSANTINE) 50 mg tablet Take 75 mg by mouth two (2) times a day.     Indications: TRANSIENT CEREBRAL ISCHEMIA    Cetirizine (ZYRTEC) 10 mg Cap Take 10 mg by mouth nightly. Social History:  Social History   Substance Use Topics    Smoking status: Never Smoker    Smokeless tobacco: Never Used    Alcohol use No     Pt denies any history of IV drug use, blood transfusions, tattoos, prophylactic antibiotics prior to dental work, cardiac stents, pacemaker placement, or vaccinations for Hep A or B. Family History:  Family History   Problem Relation Age of Onset    Heart Disease Mother     Lung Disease Father     Cancer Father        Review of Systems:  A detailed 10 system ROS is obtained, with pertinent positives as listed above. All others are negative. Physical Exam:   Vitals:  Visit Vitals    /86    Pulse 74    Temp 97.9 °F (36.6 °C)    Resp 23    Ht 5' 6\" (1.676 m)    Wt 63.5 kg (140 lb)    SpO2 92%    BMI 22.6 kg/m2       HEENT:  No scleral icterus, no oral ulcers    Heart:  Regular rate and rhythm, no murmurs  Lungs:  Clear to auscultation bilaterally, no accessory muscle use  Abdomen: Protuberant, firm, rare high pitched bowel sounds, moderate tenderness without rebound or guarding  Rectal:  Deferred  Extremities:  No cyanosis or leg edema  Skin:  No rashes, no spider angiomas over the anterior chest wall  Neuro:  Awake, alert and oriented to person, place, and time  Lymphatic:  No cervical or supraclavicular adenopathy    Data:    Recent Results (from the past 24 hour(s))   EKG, 12 LEAD, INITIAL    Collection Time: 02/16/18  1:37 AM   Result Value Ref Range    Ventricular Rate 79 BPM    Atrial Rate 79 BPM    P-R Interval 220 ms    QRS Duration 70 ms    Q-T Interval 398 ms    QTC Calculation (Bezet) 456 ms    Calculated P Axis 60 degrees    Calculated R Axis 51 degrees    Calculated T Axis 12 degrees    Diagnosis       !! AGE AND GENDER SPECIFIC ECG ANALYSIS !!   Sinus rhythm with 1st degree A-V block with occasional Premature ventricular   complexes and Possible Premature atrial  complexes with Aberrant conduction  Cannot rule out Anterior infarct (cited on or before 11-MAY-2015)  Abnormal ECG  When compared with ECG of 11-MAY-2015 12:34,  Significant changes have occurred     CBC WITH AUTOMATED DIFF    Collection Time: 02/16/18  1:46 AM   Result Value Ref Range    WBC 9.9 4.3 - 11.1 K/uL    RBC 4.21 4.05 - 5.25 M/uL    HGB 13.6 11.7 - 15.4 g/dL    HCT 39.9 35.8 - 46.3 %    MCV 94.8 79.6 - 97.8 FL    MCH 32.3 26.1 - 32.9 PG    MCHC 34.1 31.4 - 35.0 g/dL    RDW 14.8 (H) 11.9 - 14.6 %    PLATELET 949 278 - 459 K/uL    MPV 10.5 (L) 10.8 - 14.1 FL    DF AUTOMATED      NEUTROPHILS 69 43 - 78 %    LYMPHOCYTES 16 13 - 44 %    MONOCYTES 12 4.0 - 12.0 %    EOSINOPHILS 2 0.5 - 7.8 %    BASOPHILS 1 0.0 - 2.0 %    IMMATURE GRANULOCYTES 0 0.0 - 5.0 %    ABS. NEUTROPHILS 6.8 1.7 - 8.2 K/UL    ABS. LYMPHOCYTES 1.6 0.5 - 4.6 K/UL    ABS. MONOCYTES 1.2 0.1 - 1.3 K/UL    ABS. EOSINOPHILS 0.2 0.0 - 0.8 K/UL    ABS. BASOPHILS 0.1 0.0 - 0.2 K/UL    ABS. IMM. GRANS. 0.0 0.0 - 0.5 K/UL   METABOLIC PANEL, COMPREHENSIVE    Collection Time: 02/16/18  1:46 AM   Result Value Ref Range    Sodium 138 136 - 145 mmol/L    Potassium 5.3 (H) 3.5 - 5.1 mmol/L    Chloride 103 98 - 107 mmol/L    CO2 26 21 - 32 mmol/L    Anion gap 9 7 - 16 mmol/L    Glucose 104 (H) 65 - 100 mg/dL    BUN 22 8 - 23 MG/DL    Creatinine 0.90 0.6 - 1.0 MG/DL    GFR est AA >60 >60 ml/min/1.73m2    GFR est non-AA >60 >60 ml/min/1.73m2    Calcium 9.2 8.3 - 10.4 MG/DL    Bilirubin, total 0.5 0.2 - 1.1 MG/DL    ALT (SGPT) 34 12 - 65 U/L    AST (SGOT) 42 (H) 15 - 37 U/L    Alk.  phosphatase 73 50 - 136 U/L    Protein, total 7.8 6.3 - 8.2 g/dL    Albumin 3.5 3.2 - 4.6 g/dL    Globulin 4.3 (H) 2.3 - 3.5 g/dL    A-G Ratio 0.8 (L) 1.2 - 3.5     POC TROPONIN-I    Collection Time: 02/16/18  1:54 AM   Result Value Ref Range    Troponin-I (POC) 0.01 (L) 0.02 - 0.05 ng/ml       impression/Plan:       80 y.o. female with h/o CVA and GERD, presenting with abdominal pain and distention with CT suggesting sigmoid volvulus. I have discussed with patient and her daughter my recommendation that we perform a prompt sigmoidoscopy for decompression. They are agreeable to proceed. Further recommendations will follow. If the procedure is successful, patient will likely be okay to return to nursing home.     Signed:  Tamera Garcia MD  2/16/2018  6:53 AM

## 2018-02-16 NOTE — ED PROVIDER NOTES
HPI Comments: 80year old lady who presents with concerns about epigastric/lower chest pain started couple hours prior to arrival.  Patient says on arrival to the ER that she is feeling better although she still has a minor discomfort. She said she was nauseated earlier but has not had any actual vomiting or diarrhea. She denies any difficulty breathing or shortness of breath. Elements of this note were created using speech recognition software. As such, errors of speech recognition may be present. The history is provided by the patient and the EMS personnel. Past Medical History:   Diagnosis Date    GERD (gastroesophageal reflux disease)     Other ill-defined conditions(799.89)     has frequent UTI's, polio    Polio     since age 3    Stroke ([facility] [address] 2011    no side effects - TIA       Past Surgical History:   Procedure Laterality Date    ABDOMEN SURGERY PROC UNLISTED      hernia    HX ADENOIDECTOMY      HX GI      hemorrhoidectomy    HX HYSTERECTOMY      HX ORTHOPAEDIC      right foot and leg x2    HX TONSILLECTOMY           Family History:   Problem Relation Age of Onset    Heart Disease Mother     Lung Disease Father     Cancer Father        Social History     Social History    Marital status:      Spouse name: N/A    Number of children: N/A    Years of education: N/A     Occupational History    Not on file. Social History Main Topics    Smoking status: Never Smoker    Smokeless tobacco: Never Used    Alcohol use No    Drug use: No    Sexual activity: Not on file     Other Topics Concern    Not on file     Social History Narrative         ALLERGIES: Amoxicillin; Codeine; Keflex [cephalexin]; Penicillins; and Sulfa (sulfonamide antibiotics)    Review of Systems   Constitutional: Negative for chills, diaphoresis and fever. HENT: Negative for congestion, rhinorrhea and sore throat. Eyes: Negative for redness and visual disturbance.    Respiratory: Negative for cough, chest tightness, shortness of breath and wheezing. Cardiovascular: Positive for chest pain. Negative for palpitations. Gastrointestinal: Positive for abdominal pain and nausea. Negative for blood in stool, diarrhea and vomiting. Endocrine: Negative for polydipsia and polyuria. Genitourinary: Negative for dysuria and hematuria. Musculoskeletal: Negative for arthralgias, myalgias and neck stiffness. Skin: Negative for rash. Allergic/Immunologic: Negative for environmental allergies and food allergies. Neurological: Negative for dizziness, weakness and headaches. Hematological: Negative for adenopathy. Does not bruise/bleed easily. Psychiatric/Behavioral: Negative for confusion and sleep disturbance. The patient is not nervous/anxious. There were no vitals filed for this visit. Physical Exam   Constitutional: She is oriented to person, place, and time. She appears well-developed and well-nourished. HENT:   Head: Normocephalic and atraumatic. Eyes: Conjunctivae and EOM are normal. Pupils are equal, round, and reactive to light. Neck: Normal range of motion. Cardiovascular: Normal rate and regular rhythm. Pulmonary/Chest: Effort normal and breath sounds normal. No respiratory distress. She has no wheezes. She has no rales. She exhibits no tenderness. Abdominal: Soft. Bowel sounds are normal. There is no rebound and no guarding. Patient had no focal tenderness to palpation but mild distention. Musculoskeletal: Normal range of motion. She exhibits no edema or tenderness. Lymphadenopathy:     She has no cervical adenopathy. Neurological: She is alert and oriented to person, place, and time. Skin: Skin is warm and dry. Psychiatric: She has a normal mood and affect. Nursing note and vitals reviewed.        MDM  Number of Diagnoses or Management Options  Sigmoid volvulus [facility]):   Diagnosis management comments: Patient seems to be feeling better than she did earlier. Her abdominal exam was little bit abnormal with some mild distention and mild tenderness. Given the question of whether her pain is really more in her chest or more in her abdomen I will get a troponin as well as check a CT scan of her abdomen. Ila Argueta MD 5:38 AM  I took over care of patient there is dilated loops of bowel with likely volvulus on CT. I called and discussed case with Dr. Blanca Gruber who felt patient would best be served by GI with sigmoidoscopy. I called gastroenterology discussed case with Dr. Hunter Gutierrez who states that they will evaluate patient. Patient given treatment for nausea.    ===================================================================  I have received Jerie Memory from Dr. Sarmad Kelley    We discussed the patient's complaint, presentation, vitals and differential diagnosis. We discussed the patient's current status, and response to treatments  We reviewed critical lab values, allergies, and other factors. Issues or problems that are still being evaluated are: repeat abd series    We rounded at the patient's bedside, the patient and family's questions and concerns were addressed. EXAM- Elderly nontoxic 70-year-old female. Diagnosed with a sigmoid volvulus overnight. Had a sigmoidoscopy this morning decompressed bowel. At this time she is awake alert oriented answering questions following commands. Abdomen is soft mild distention without tenderness. No rebound masses or guarding. Assessment/Plan- 2 sigmoid volvulus. Awake alert and oriented. Family would like her to go back to retirement    Dr. Millie Shaw expressed some concerns about abnormal heart rhythm while she was in the GI lab. She appears to be in a sinus rhythm at this time. She does have a pronounced murmur/click. No evidence of A. Fib. Vital signs are unremarkable    We will get an abdominal series. Reassess.     Gabrielle Mobley MD; 2/16/2018 @10:53 AM  ===================================================================      ED Course       Procedures

## 2018-02-16 NOTE — PROGRESS NOTES
TRANSFER - IN REPORT:    Verbal report received from Davon Kuo, [facility] (name) on [facility]  being received from ER- Bed 8 (unit) for ordered procedure. Report consisted of patients Situation, Background, Assessment and   Recommendations(SBAR). Information from the following report(s) SBAR was reviewed with the receiving nurse. Opportunity for questions and clarification was provided. Awaiting transport to bring pt to the GI Lab at this time.

## 2018-02-16 NOTE — ED TRIAGE NOTES
\"My stomach hurts\"  Patients daughter states Joseluis Carpenter has been complaining of a stomach ache off and on for a while\"

## 2018-02-17 VITALS
DIASTOLIC BLOOD PRESSURE: 76 MMHG | SYSTOLIC BLOOD PRESSURE: 166 MMHG | BODY MASS INDEX: 22.5 KG/M2 | WEIGHT: 140 LBS | HEART RATE: 77 BPM | TEMPERATURE: 98.4 F | OXYGEN SATURATION: 91 % | HEIGHT: 66 IN | RESPIRATION RATE: 18 BRPM

## 2018-02-17 LAB
ALBUMIN SERPL-MCNC: 2.8 G/DL (ref 3.2–4.6)
ALBUMIN/GLOB SERPL: 0.9 {RATIO} (ref 1.2–3.5)
ALP SERPL-CCNC: 66 U/L (ref 50–136)
ALT SERPL-CCNC: 24 U/L (ref 12–65)
ANION GAP SERPL CALC-SCNC: 8 MMOL/L (ref 7–16)
AST SERPL-CCNC: 18 U/L (ref 15–37)
BASOPHILS # BLD: 0 K/UL (ref 0–0.2)
BASOPHILS NFR BLD: 1 % (ref 0–2)
BILIRUB SERPL-MCNC: 0.6 MG/DL (ref 0.2–1.1)
BUN SERPL-MCNC: 12 MG/DL (ref 8–23)
CALCIUM SERPL-MCNC: 8.2 MG/DL (ref 8.3–10.4)
CHLORIDE SERPL-SCNC: 109 MMOL/L (ref 98–107)
CO2 SERPL-SCNC: 26 MMOL/L (ref 21–32)
CREAT SERPL-MCNC: 0.62 MG/DL (ref 0.6–1)
DIFFERENTIAL METHOD BLD: ABNORMAL
EOSINOPHIL # BLD: 0.1 K/UL (ref 0–0.8)
EOSINOPHIL NFR BLD: 1 % (ref 0.5–7.8)
ERYTHROCYTE [DISTWIDTH] IN BLOOD BY AUTOMATED COUNT: 14.6 % (ref 11.9–14.6)
GLOBULIN SER CALC-MCNC: 3.2 G/DL (ref 2.3–3.5)
GLUCOSE SERPL-MCNC: 97 MG/DL (ref 65–100)
HCT VFR BLD AUTO: 35.1 % (ref 35.8–46.3)
HGB BLD-MCNC: 11.5 G/DL (ref 11.7–15.4)
IMM GRANULOCYTES # BLD: 0 K/UL (ref 0–0.5)
IMM GRANULOCYTES NFR BLD AUTO: 0 % (ref 0–5)
LYMPHOCYTES # BLD: 1.6 K/UL (ref 0.5–4.6)
LYMPHOCYTES NFR BLD: 23 % (ref 13–44)
MCH RBC QN AUTO: 31.2 PG (ref 26.1–32.9)
MCHC RBC AUTO-ENTMCNC: 32.8 G/DL (ref 31.4–35)
MCV RBC AUTO: 95.1 FL (ref 79.6–97.8)
MONOCYTES # BLD: 0.9 K/UL (ref 0.1–1.3)
MONOCYTES NFR BLD: 13 % (ref 4–12)
NEUTS SEG # BLD: 4.3 K/UL (ref 1.7–8.2)
NEUTS SEG NFR BLD: 62 % (ref 43–78)
PLATELET # BLD AUTO: 149 K/UL (ref 150–450)
PMV BLD AUTO: 10.1 FL (ref 10.8–14.1)
POTASSIUM SERPL-SCNC: 3.6 MMOL/L (ref 3.5–5.1)
PROT SERPL-MCNC: 6 G/DL (ref 6.3–8.2)
RBC # BLD AUTO: 3.69 M/UL (ref 4.05–5.25)
SODIUM SERPL-SCNC: 143 MMOL/L (ref 136–145)
WBC # BLD AUTO: 6.8 K/UL (ref 4.3–11.1)

## 2018-02-17 PROCEDURE — 96361 HYDRATE IV INFUSION ADD-ON: CPT

## 2018-02-17 PROCEDURE — 74011250637 HC RX REV CODE- 250/637: Performed by: INTERNAL MEDICINE

## 2018-02-17 PROCEDURE — 85025 COMPLETE CBC W/AUTO DIFF WBC: CPT | Performed by: INTERNAL MEDICINE

## 2018-02-17 PROCEDURE — 99218 HC RM OBSERVATION: CPT

## 2018-02-17 PROCEDURE — 80053 COMPREHEN METABOLIC PANEL: CPT | Performed by: INTERNAL MEDICINE

## 2018-02-17 PROCEDURE — 36415 COLL VENOUS BLD VENIPUNCTURE: CPT | Performed by: INTERNAL MEDICINE

## 2018-02-17 RX ADMIN — HYDROCODONE BITARTRATE AND ACETAMINOPHEN 1 TABLET: 5; 325 TABLET ORAL at 00:48

## 2018-02-17 RX ADMIN — DIPYRIDAMOLE 50 MG: 50 TABLET, FILM COATED ORAL at 09:08

## 2018-02-17 RX ADMIN — Medication 1 AMPULE: at 09:00

## 2018-02-17 RX ADMIN — DIVALPROEX SODIUM 125 MG: 125 TABLET, DELAYED RELEASE ORAL at 09:08

## 2018-02-17 RX ADMIN — LISINOPRIL 10 MG: 5 TABLET ORAL at 09:08

## 2018-02-17 RX ADMIN — ASPIRIN 81 MG: 81 TABLET, COATED ORAL at 09:08

## 2018-02-17 RX ADMIN — FAMOTIDINE 20 MG: 20 TABLET, FILM COATED ORAL at 09:08

## 2018-02-17 RX ADMIN — Medication 10 ML: at 05:55

## 2018-02-17 RX ADMIN — MEMANTINE HYDROCHLORIDE 5 MG: 5 TABLET ORAL at 09:08

## 2018-02-17 NOTE — DISCHARGE INSTRUCTIONS
Bowel Blockage (Intestinal Obstruction): Care Instructions  Your Care Instructions  A bowel blockage, also called an intestinal obstruction, can prevent gas, fluids, or solids from moving through the intestines normally. It can cause constipation and, rarely, diarrhea. You may have pain, nausea, vomiting, and cramping. Most of the time, complete blockages require a stay in the hospital and possibly surgery. But if your bowel is only partly blocked, your doctor may tell you to wait until it clears on its own and you are able to pass gas and stool. If so, there are things you can do at home to help make you feel better. If you have had surgery for a bowel blockage, there are things you can do at home to make sure you heal well. You can also make some changes to keep your bowel from becoming blocked again. Follow-up care is a key part of your treatment and safety. Be sure to make and go to all appointments, and call your doctor if you are having problems. It's also a good idea to know your test results and keep a list of the medicines you take. How can you care for yourself at home? If your doctor has told you to wait at home for a blockage to clear on its own:  · Follow your doctor's instructions. These may include eating a liquid diet to avoid complete blockage. · Be safe with medicines. Take your medicines exactly as prescribed. Call your doctor if you think you are having a problem with your medicine. · Put a heating pad set on low on your belly to relieve mild cramps and pain. To prevent another blockage  · Try to eat smaller amounts of food more often. For example, have 5 or 6 small meals throughout the day instead of 2 or 3 large meals. · Chew your food very well. Try to chew each bite about 20 times or until it is liquid. · Avoid high-fiber foods and raw fruits and vegetables with skins, husks, strings, or seeds.  These can form a ball of undigested material that can cause a blockage if a part of your bowel is scarred or narrowed. · Check with your doctor before you eat whole-grain products or use a fiber supplement such as Citrucel or Metamucil. · To help you have regular bowel movements, eat at regular times, do not strain during a bowel movement, and drink at least 8 to 10 glasses of water each day. If you have kidney, heart, or liver disease and have to limit fluids, talk with your doctor or before you increase the amount of fluids you drink. · Drink high-calorie liquid formulas if your doctor says to. Severe symptoms may make it hard for your body to take in vitamins and minerals. · Get regular exercise. It helps you digest your food better. Get at least 30 minutes of physical activity on most days of the week. Walking is a good choice. When should you call for help? Call your doctor now or seek immediate medical care if:  ? · You have a fever. ? · You are vomiting. ? · You have new or worse belly pain. ? · You cannot pass stools or gas. ? Watch closely for changes in your health, and be sure to contact your doctor if you have any problems. Where can you learn more? Go to http://israel-stuart.info/. Enter X507 in the search box to learn more about \"Bowel Blockage (Intestinal Obstruction): Care Instructions. \"  Current as of: May 12, 2017  Content Version: 11.4  © 5437-5913 [employer]ention[employer]. Care instructions adapted under license by [employer] (which disclaims liability or warranty for this information). If you have questions about a medical condition or this instruction, always ask your healthcare professional. [place] [address] any warranty or liability for your use of this information.     DISCHARGE SUMMARY from Nurse    PATIENT INSTRUCTIONS:    After general anesthesia or intravenous sedation, for 24 hours or while taking prescription Narcotics:  · Limit your activities  · Do not drive and operate hazardous machinery  · Do not make important personal or business decisions  · Do  not drink alcoholic beverages  · If you have not urinated within 8 hours after discharge, please contact your surgeon on call. Report the following to your surgeon:  · Excessive pain, swelling, redness or odor of or around the surgical area  · Temperature over 100.5  · Nausea and vomiting lasting longer than 4 hours or if unable to take medications  · Any signs of decreased circulation or nerve impairment to extremity: change in color, persistent  numbness, tingling, coldness or increase pain  · Any questions    What to do at Home:    *  Please give a list of your current medications to your Primary Care Provider. *  Please update this list whenever your medications are discontinued, doses are      changed, or new medications (including over-the-counter products) are added. *  Please carry medication information at all times in case of emergency situations. These are general instructions for a healthy lifestyle:    No smoking/ No tobacco products/ Avoid exposure to second hand smoke  Surgeon General's Warning:  Quitting smoking now greatly reduces serious risk to your health. Obesity, smoking, and sedentary lifestyle greatly increases your risk for illness    A healthy diet, regular physical exercise & weight monitoring are important for maintaining a healthy lifestyle    You may be retaining fluid if you have a history of heart failure or if you experience any of the following symptoms:  Weight gain of 3 pounds or more overnight or 5 pounds in a week, increased swelling in our hands or feet or shortness of breath while lying flat in bed. Please call your doctor as soon as you notice any of these symptoms; do not wait until your next office visit.     Recognize signs and symptoms of STROKE:    F-face looks uneven    A-arms unable to move or move unevenly    S-speech slurred or non-existent    T-time-call 911 as soon as signs and symptoms begin-DO NOT go Back to bed or wait to see if you get better-TIME IS BRAIN. Warning Signs of HEART ATTACK     Call 911 if you have these symptoms:   Chest discomfort. Most heart attacks involve discomfort in the center of the chest that lasts more than a few minutes, or that goes away and comes back. It can feel like uncomfortable pressure, squeezing, fullness, or pain.  Discomfort in other areas of the upper body. Symptoms can include pain or discomfort in one or both arms, the back, neck, jaw, or stomach.  Shortness of breath with or without chest discomfort.  Other signs may include breaking out in a cold sweat, nausea, or lightheadedness. Don't wait more than five minutes to call 911 - MINUTES MATTER! Fast action can save your life. Calling 911 is almost always the fastest way to get lifesaving treatment. Emergency Medical Services staff can begin treatment when they arrive -- up to an hour sooner than if someone gets to the hospital by car. The discharge information has been reviewed with the patient. The patient verbalized understanding. Discharge medications reviewed with the patient and appropriate educational materials and side effects teaching were provided.   ___________________________________________________________________________________________________________________________________

## 2018-02-17 NOTE — PROGRESS NOTES
Daughter decided to transport patient in her car. Pt getting dressed will call for wheelchair when ready.

## 2018-02-17 NOTE — PROGRESS NOTES
Discharge instructions and prescriptions provided and explained to the pt's daughter. Med side effect sheet reviewed. Opportunity for questions provided. SW scheduling [facility] transport. Instructed to call once ready to leave.

## 2018-02-17 NOTE — PROGRESS NOTES
Care Management Interventions  Mode of Transport at Discharge:  (daughters)  Transition of Care Consult (CM Consult): Assisted Living ([address])  Current Support Network: Assisted Living  Confirm Follow Up Transport: Family  Plan discussed with Pt/Family/Caregiver: Yes  Freedom of Choice Offered: Yes  Discharge Location  Discharge Placement: Assisted Living (Return to [address]

## 2018-02-17 NOTE — DISCHARGE SUMMARY
Hospitalist Discharge Summary     Patient ID:  London Byrne  [id number]  57 y.o.  1935  Admit date: 2/16/2018  1:31 AM  Discharge date and time: 2/17/2018  Attending: Vanna Lyons MD  PCP:  Yasmin Henderson MD  Treatment Team: Attending Provider: Vanna Lyons MD; Consulting Provider: Joseluis Lyons MD; Utilization Review: Jayy Nevarez    Principal Diagnosis Volvulus of sigmoid colon [facility])   Principal Problem:    Volvulus of sigmoid colon ([facility] [address] (2/16/2018)     hpi:   Ms. Luis Linn is a 79 yo female with PMH of CVA, GERD who presented to the ED with c/o abd pain. Pt family at bedside and reports pt lives in [place]. She had sudden onset of diffuse abd pain starting about 12 hours prior to presenting to the ED. CT abd showed sigmoid volvulus. GI consulted and pt underwent a sigmoidoscopy for decompression. During the procedure pt had runs of tachycardia. Also with continued abd distention but no pain. Hospital Course:  Please refer to the admission H&P for details of presentation. In summary, the patient presented with abdominal pain and distention post sigmoidoscopy for decompression regarding volvulus. Monitored overnight on ivf with resolution of abdominal pain and distention. Pt with positive bowel sounds and has tolerated po without symptoms. Currently stable for dc to home, will follow up with pcp and gi      Labs: Results:       Chemistry Recent Labs      02/17/18   0548  02/16/18   0146   GLU  97  104*   NA  143  138   K  3.6  5.3*   CL  109*  103   CO2  26  26   BUN  12  22   CREA  0.62  0.90   CA  8.2*  9.2   AGAP  8  9   AP  66  73   TP  6.0*  7.8   ALB  2.8*  3.5   GLOB  3.2  4.3*   AGRAT  0.9*  0.8*      CBC w/Diff Recent Labs      02/17/18   0548  02/16/18   0146   WBC  6.8  9.9   RBC  3.69*  4.21   HGB  11.5*  13.6   HCT  35.1*  39.9   PLT  149*  181   GRANS  62  69   LYMPH  23  16   EOS  1  2      Cardiac Enzymes No results for input(s): CPK, CKND1, AVERY in the last 72 hours.     No lab exists for component: CKRMB, TROIP   Coagulation No results for input(s): PTP, INR, APTT in the last 72 hours. No lab exists for component: INREXT    Lipid Panel No results found for: CHOL, CHOLPOCT, CHOLX, CHLST, CHOLV, 737562, HDL, LDL, LDLC, DLDLP, 453966, VLDLC, VLDL, TGLX, TRIGL, TRIGP, TGLPOCT, CHHD, CHHDX   BNP No results for input(s): BNPP in the last 72 hours. Liver Enzymes Recent Labs      02/17/18   0548   TP  6.0*   ALB  2.8*   AP  66   SGOT  18      Thyroid Studies No results found for: T4, T3U, TSH, TSHEXT         Discharge Exam:  Visit Vitals    /76 (BP 1 Location: Right arm, BP Patient Position: At rest)    Pulse 77    Temp 98.4 °F (36.9 °C)    Resp 18    Ht 5' 6\" (1.676 m)    Wt 63.5 kg (140 lb)    SpO2 91%    BMI 22.6 kg/m2     General appearance: alert, cooperative, no distress, appears stated age  Lungs: clear to auscultation bilaterally  Heart: regular rate and rhythm, S1, S2 normal, no murmur, click, rub or gallop  Abdomen: soft, non-tender. Bowel sounds normal. No masses,  no organomegaly  Extremities: no cyanosis or edema  Neurologic: Grossly normal    Disposition: home  Discharge Condition: stable  Patient Instructions:   Current Discharge Medication List      CONTINUE these medications which have NOT CHANGED    Details   !! divalproex DR (DEPAKOTE) 125 mg tablet Take 125 mg by mouth three (3) times daily as needed for Other (agitation). LORazepam (ATIVAN) 0.5 mg tablet Take 0.5 mg by mouth two (2) times daily as needed for Anxiety. guaiFENesin (MUCINEX) 1,200 mg Ta12 ER tablet Take 1,200 mg by mouth two (2) times a day. ondansetron hcl (ZOFRAN, AS HYDROCHLORIDE,) 4 mg tablet Take 4 mg by mouth every eight (8) hours as needed for Nausea. traMADol (ULTRAM) 50 mg tablet Take 25 mg by mouth every six (6) hours as needed for Pain. citalopram (CELEXA) 20 mg tablet Take 20 mg by mouth every evening.       !! divalproex  (DEPAKOTE) 125 mg tablet Take 125 mg by mouth daily. furosemide (LASIX) 20 mg tablet Take 30 mg by mouth daily. memantine (NAMENDA) 5 mg tablet Take 5 mg by mouth two (2) times a day. aspirin delayed-release 81 mg tablet Take  by mouth daily. docusate sodium (COLACE) 100 mg capsule Take 100 mg by mouth two (2) times daily as needed. lisinopril (PRINIVIL, ZESTRIL) 20 mg tablet Take 10 mg by mouth daily. ranitidine (ZANTAC) 150 mg tablet Take 150 mg by mouth two (2) times a day. dipyridamole (PERSANTINE) 50 mg tablet Take 50 mg by mouth two (2) times a day. Indications: Transient Cerebral Ischemia      Cetirizine (ZYRTEC) 10 mg Cap Take 10 mg by mouth nightly. lidocaine 5 % topical cream Apply  to affected area as directed. bisacodyl (DULCOLAX) 10 mg suppository Insert 10 mg into rectum daily as needed. pravastatin (PRAVACHOL) 10 mg tablet Take 20 mg by mouth nightly. !! - Potential duplicate medications found. Please discuss with provider.       STOP taking these medications       nitrofurantoin 100 mg Cap Comments:   Reason for Stopping:               Activity: Activity as tolerated  Diet: Regular Diet  Wound Care: None needed    Follow-up  ·   With pcp, gi  Time spent to discharge patient 35 minutes  Signed:  Ankita Tripp MD  2/17/2018  10:39 AM

## 2018-02-17 NOTE — PROGRESS NOTES
Rounding done every hour and PRN while patient in room. Patient resting in room. No signs or symptoms of distress. No changes in status. Patient denies any further needs or pain at this time.

## 2018-03-08 ENCOUNTER — APPOINTMENT (OUTPATIENT)
Dept: GENERAL RADIOLOGY | Age: 83
End: 2018-03-08
Attending: EMERGENCY MEDICINE
Payer: MEDICARE

## 2018-03-08 ENCOUNTER — HOSPITAL ENCOUNTER (EMERGENCY)
Age: 83
Discharge: HOME | End: 2018-03-08
Attending: EMERGENCY MEDICINE
Payer: MEDICARE

## 2018-03-08 VITALS
TEMPERATURE: 97.4 F | RESPIRATION RATE: 16 BRPM | BODY MASS INDEX: 24.99 KG/M2 | SYSTOLIC BLOOD PRESSURE: 153 MMHG | HEART RATE: 83 BPM | DIASTOLIC BLOOD PRESSURE: 86 MMHG | WEIGHT: 150 LBS | HEIGHT: 65 IN | OXYGEN SATURATION: 94 %

## 2018-03-08 DIAGNOSIS — S80.12XA HEMATOMA OF LEG, LEFT, INITIAL ENCOUNTER: Primary | ICD-10-CM

## 2018-03-08 PROCEDURE — 99284 EMERGENCY DEPT VISIT MOD MDM: CPT | Performed by: EMERGENCY MEDICINE

## 2018-03-08 PROCEDURE — 73590 X-RAY EXAM OF LOWER LEG: CPT

## 2018-03-09 NOTE — ED TRIAGE NOTES
Brought by ems. Nemo Baker. No LOC. From [facility]. Hematoma on left shin from fall. Takes ASA, orientedx3.

## 2018-03-09 NOTE — ED NOTES
I have reviewed discharge instructions with the patient and daughter. The patient and daughter verbalized understanding. Patient left ED via Discharge Method: wheelchair to Home with daughter. Opportunity for questions and clarification provided. Patient given 0 scripts. To continue your aftercare when you leave the hospital, you may receive an automated call from our care team to check in on how you are doing. This is a free service and part of our promise to provide the best care and service to meet your aftercare needs.  If you have questions, or wish to unsubscribe from this service please call [phone]. Thank you for Choosing our [facility] Emergency Department.

## 2018-03-09 NOTE — ED PROVIDER NOTES
Patient is a 80 y.o. female presenting with fall. The history is provided by the patient, the nursing home and the EMS personnel. Fall   The accident occurred 3 to 5 hours ago. The fall occurred while walking. She fell from a height of ground level. Point of impact: Left Shin. Pain location: left Shin. The pain is mild. There was no entrapment after the fall. There was no drug use involved in the accident. There was no alcohol use involved in the accident. Pertinent negatives include no fever, no abdominal pain, no vomiting, no headaches, no loss of consciousness and no laceration. The risk factors include dementia and being elderly. The symptoms are aggravated by use of injured limb. She has tried nothing for the symptoms. Past Medical History:   Diagnosis Date    GERD (gastroesophageal reflux disease)     Other ill-defined conditions(799.89)     has frequent UTI's, polio    Polio     since age 3    Stroke ([facility] [address] 2011    no side effects - TIA       Past Surgical History:   Procedure Laterality Date    ABDOMEN SURGERY PROC UNLISTED      hernia    FLEXIBLE SIGMOIDOSCOPY N/A 2/16/2018    SIGMOIDOSCOPY FLEXIBLE performed by Felicia Recio [facility] at [facility] ENDOSCOPY    HX ADENOIDECTOMY      HX GI      hemorrhoidectomy    HX HYSTERECTOMY      HX ORTHOPAEDIC      right foot and leg x2    HX TONSILLECTOMY           Family History:   Problem Relation Age of Onset    Heart Disease Mother     Lung Disease Father     Cancer Father        Social History     Social History    Marital status:      Spouse name: N/A    Number of children: N/A    Years of education: N/A     Occupational History    Not on file. Social History Main Topics    Smoking status: Never Smoker    Smokeless tobacco: Never Used    Alcohol use No    Drug use: No    Sexual activity: Not on file     Other Topics Concern    Not on file     Social History Narrative         ALLERGIES: Amoxicillin; Codeine;  Keflex [cephalexin]; Penicillins; and Sulfa (sulfonamide antibiotics)    Review of Systems   Unable to perform ROS: Dementia   Constitutional: Negative for chills and fever. HENT: Negative for congestion, ear pain and rhinorrhea. Eyes: Negative for photophobia and discharge. Respiratory: Negative for cough and shortness of breath. Cardiovascular: Negative for chest pain and palpitations. Gastrointestinal: Negative for abdominal pain, constipation, diarrhea and vomiting. Endocrine: Negative for cold intolerance and heat intolerance. Genitourinary: Negative for dysuria and flank pain. Musculoskeletal: Negative for arthralgias, myalgias and neck pain. Skin: Negative for rash and wound. Allergic/Immunologic: Negative for environmental allergies and food allergies. Neurological: Negative for loss of consciousness, syncope and headaches. Hematological: Negative for adenopathy. Does not bruise/bleed easily. Psychiatric/Behavioral: Positive for confusion. Negative for dysphoric mood. The patient is not nervous/anxious. All other systems reviewed and are negative. Vitals:    03/08/18 2002   BP: (!) 133/94   Pulse: 88   Resp: 16   Temp: 97.4 °F (36.3 °C)   SpO2: 94%   Weight: 68 kg (150 lb)   Height: 5' 5\" (1.651 m)            Physical Exam   Constitutional: She is oriented to person, place, and time. She appears well-developed and well-nourished. She appears distressed. HENT:   Head: Normocephalic and atraumatic. Mouth/Throat: Oropharynx is clear and moist.   Eyes: Conjunctivae and EOM are normal. Pupils are equal, round, and reactive to light. Right eye exhibits no discharge. Left eye exhibits no discharge. No scleral icterus. Neck: Normal range of motion. Neck supple. No thyromegaly present. Cardiovascular: Normal rate, regular rhythm and intact distal pulses. Exam reveals no gallop and no friction rub. Murmur heard.   Pulmonary/Chest: Effort normal and breath sounds normal. No respiratory distress. She has no wheezes. She exhibits no tenderness. Abdominal: Soft. Bowel sounds are normal. She exhibits no distension. There is no hepatosplenomegaly. There is no tenderness. There is no rebound and no guarding. No hernia. Musculoskeletal: Normal range of motion. She exhibits no edema or tenderness. Legs:  Neurological: She is alert and oriented to person, place, and time. No cranial nerve deficit. She exhibits normal muscle tone. Skin: Skin is warm. No laceration and no rash noted. She is not diaphoretic. No erythema. Psychiatric: She has a normal mood and affect. Her behavior is normal. Judgment and thought content normal.   Nursing note and vitals reviewed. MDM  Number of Diagnoses or Management Options  Hematoma of leg, left, initial encounter: new and requires workup  Diagnosis management comments: Contusion, hematoma, fracture       Amount and/or Complexity of Data Reviewed  Tests in the radiology section of CPT®: ordered and reviewed  Review and summarize past medical records: yes    Risk of Complications, Morbidity, and/or Mortality  Presenting problems: moderate  Diagnostic procedures: low  Management options: low  General comments: Elements of this note have been dictated via voice recognition software. Text and phrases may be limited by the accuracy of the software. The chart has been reviewed, but errors may still be present.       Patient Progress  Patient progress: stable        ED Course       Procedures

## 2018-03-12 ENCOUNTER — HOSPITAL ENCOUNTER (INPATIENT)
Age: 83
LOS: 1 days | Discharge: HOME | DRG: 345 | End: 2018-03-14
Attending: INTERNAL MEDICINE | Admitting: INTERNAL MEDICINE
Payer: MEDICARE

## 2018-03-12 PROBLEM — K56.2 VOLVULUS (HCC): Status: ACTIVE | Noted: 2018-03-12

## 2018-03-12 PROBLEM — R14.0 GASEOUS ABDOMINAL DISTENTION: Status: ACTIVE | Noted: 2018-03-12

## 2018-03-12 PROBLEM — F03.918 DEMENTIA WITH BEHAVIORAL DISTURBANCE: Status: ACTIVE | Noted: 2018-03-12

## 2018-03-12 LAB
ANION GAP SERPL CALC-SCNC: 9 MMOL/L (ref 7–16)
BASOPHILS # BLD: 0.1 K/UL (ref 0–0.2)
BASOPHILS NFR BLD: 1 % (ref 0–2)
BUN SERPL-MCNC: 11 MG/DL (ref 8–23)
CALCIUM SERPL-MCNC: 9.5 MG/DL (ref 8.3–10.4)
CHLORIDE SERPL-SCNC: 101 MMOL/L (ref 98–107)
CO2 SERPL-SCNC: 29 MMOL/L (ref 21–32)
CREAT SERPL-MCNC: 0.79 MG/DL (ref 0.6–1)
DIFFERENTIAL METHOD BLD: ABNORMAL
EOSINOPHIL # BLD: 0.2 K/UL (ref 0–0.8)
EOSINOPHIL NFR BLD: 2 % (ref 0.5–7.8)
ERYTHROCYTE [DISTWIDTH] IN BLOOD BY AUTOMATED COUNT: 14.7 % (ref 11.9–14.6)
GLUCOSE SERPL-MCNC: 121 MG/DL (ref 65–100)
HCT VFR BLD AUTO: 43 % (ref 35.8–46.3)
HGB BLD-MCNC: 14.6 G/DL (ref 11.7–15.4)
IMM GRANULOCYTES # BLD: 0 K/UL (ref 0–0.5)
IMM GRANULOCYTES NFR BLD AUTO: 0 % (ref 0–5)
LYMPHOCYTES # BLD: 1.7 K/UL (ref 0.5–4.6)
LYMPHOCYTES NFR BLD: 19 % (ref 13–44)
MCH RBC QN AUTO: 31.9 PG (ref 26.1–32.9)
MCHC RBC AUTO-ENTMCNC: 34 G/DL (ref 31.4–35)
MCV RBC AUTO: 94.1 FL (ref 79.6–97.8)
MONOCYTES # BLD: 1.1 K/UL (ref 0.1–1.3)
MONOCYTES NFR BLD: 12 % (ref 4–12)
NEUTS SEG # BLD: 5.9 K/UL (ref 1.7–8.2)
NEUTS SEG NFR BLD: 66 % (ref 43–78)
PLATELET # BLD AUTO: 191 K/UL (ref 150–450)
PMV BLD AUTO: 10.9 FL (ref 10.8–14.1)
POTASSIUM SERPL-SCNC: 3.8 MMOL/L (ref 3.5–5.1)
RBC # BLD AUTO: 4.57 M/UL (ref 4.05–5.25)
SODIUM SERPL-SCNC: 139 MMOL/L (ref 136–145)
WBC # BLD AUTO: 8.9 K/UL (ref 4.3–11.1)

## 2018-03-12 PROCEDURE — 36415 COLL VENOUS BLD VENIPUNCTURE: CPT | Performed by: INTERNAL MEDICINE

## 2018-03-12 PROCEDURE — 80048 BASIC METABOLIC PNL TOTAL CA: CPT | Performed by: INTERNAL MEDICINE

## 2018-03-12 PROCEDURE — 74011000302 HC RX REV CODE- 302: Performed by: INTERNAL MEDICINE

## 2018-03-12 PROCEDURE — 74011250637 HC RX REV CODE- 250/637: Performed by: INTERNAL MEDICINE

## 2018-03-12 PROCEDURE — 85025 COMPLETE CBC W/AUTO DIFF WBC: CPT | Performed by: INTERNAL MEDICINE

## 2018-03-12 PROCEDURE — 86580 TB INTRADERMAL TEST: CPT | Performed by: INTERNAL MEDICINE

## 2018-03-12 PROCEDURE — 74011250636 HC RX REV CODE- 250/636: Performed by: INTERNAL MEDICINE

## 2018-03-12 PROCEDURE — 99218 HC RM OBSERVATION: CPT

## 2018-03-12 RX ORDER — SODIUM CHLORIDE 0.9 % (FLUSH) 0.9 %
5-10 SYRINGE (ML) INJECTION EVERY 8 HOURS
Status: DISCONTINUED | OUTPATIENT
Start: 2018-03-12 | End: 2018-03-14 | Stop reason: HOSPADM

## 2018-03-12 RX ORDER — DIVALPROEX SODIUM 125 MG/1
125 TABLET, DELAYED RELEASE ORAL
Status: DISCONTINUED | OUTPATIENT
Start: 2018-03-12 | End: 2018-03-14 | Stop reason: HOSPADM

## 2018-03-12 RX ORDER — ONDANSETRON 2 MG/ML
4 INJECTION INTRAMUSCULAR; INTRAVENOUS
Status: DISCONTINUED | OUTPATIENT
Start: 2018-03-12 | End: 2018-03-14 | Stop reason: HOSPADM

## 2018-03-12 RX ORDER — DIPYRIDAMOLE 50 MG
50 TABLET ORAL 2 TIMES DAILY
Status: DISCONTINUED | OUTPATIENT
Start: 2018-03-12 | End: 2018-03-12 | Stop reason: SINTOL

## 2018-03-12 RX ORDER — HALOPERIDOL 5 MG/ML
3 INJECTION INTRAMUSCULAR
Status: DISCONTINUED | OUTPATIENT
Start: 2018-03-12 | End: 2018-03-14 | Stop reason: HOSPADM

## 2018-03-12 RX ORDER — TRAMADOL HYDROCHLORIDE 50 MG/1
25 TABLET ORAL
Status: DISCONTINUED | OUTPATIENT
Start: 2018-03-12 | End: 2018-03-14 | Stop reason: HOSPADM

## 2018-03-12 RX ORDER — PRAVASTATIN SODIUM 20 MG/1
20 TABLET ORAL
Status: DISCONTINUED | OUTPATIENT
Start: 2018-03-12 | End: 2018-03-14 | Stop reason: HOSPADM

## 2018-03-12 RX ORDER — LORAZEPAM 0.5 MG/1
0.5 TABLET ORAL
Status: DISCONTINUED | OUTPATIENT
Start: 2018-03-12 | End: 2018-03-14 | Stop reason: HOSPADM

## 2018-03-12 RX ORDER — FAMOTIDINE 20 MG/1
40 TABLET, FILM COATED ORAL DAILY
Status: DISCONTINUED | OUTPATIENT
Start: 2018-03-13 | End: 2018-03-14 | Stop reason: HOSPADM

## 2018-03-12 RX ORDER — ACETAMINOPHEN 325 MG/1
650 TABLET ORAL
Status: DISCONTINUED | OUTPATIENT
Start: 2018-03-12 | End: 2018-03-14 | Stop reason: HOSPADM

## 2018-03-12 RX ORDER — CITALOPRAM 20 MG/1
20 TABLET, FILM COATED ORAL EVERY EVENING
Status: DISCONTINUED | OUTPATIENT
Start: 2018-03-12 | End: 2018-03-14 | Stop reason: HOSPADM

## 2018-03-12 RX ORDER — SODIUM CHLORIDE 0.9 % (FLUSH) 0.9 %
5-10 SYRINGE (ML) INJECTION AS NEEDED
Status: DISCONTINUED | OUTPATIENT
Start: 2018-03-12 | End: 2018-03-14 | Stop reason: HOSPADM

## 2018-03-12 RX ORDER — LISINOPRIL 5 MG/1
10 TABLET ORAL DAILY
Status: DISCONTINUED | OUTPATIENT
Start: 2018-03-13 | End: 2018-03-14 | Stop reason: HOSPADM

## 2018-03-12 RX ORDER — MEMANTINE HYDROCHLORIDE 5 MG/1
5 TABLET ORAL 2 TIMES DAILY
Status: DISCONTINUED | OUTPATIENT
Start: 2018-03-12 | End: 2018-03-14 | Stop reason: HOSPADM

## 2018-03-12 RX ORDER — FACIAL-BODY WIPES
10 EACH TOPICAL
Status: DISCONTINUED | OUTPATIENT
Start: 2018-03-12 | End: 2018-03-14 | Stop reason: HOSPADM

## 2018-03-12 RX ORDER — LORATADINE 10 MG/1
10 TABLET ORAL
Status: DISCONTINUED | OUTPATIENT
Start: 2018-03-12 | End: 2018-03-14 | Stop reason: HOSPADM

## 2018-03-12 RX ORDER — SODIUM CHLORIDE 9 MG/ML
75 INJECTION, SOLUTION INTRAVENOUS CONTINUOUS
Status: DISCONTINUED | OUTPATIENT
Start: 2018-03-12 | End: 2018-03-14 | Stop reason: HOSPADM

## 2018-03-12 RX ADMIN — LORAZEPAM 0.5 MG: 0.5 TABLET ORAL at 20:41

## 2018-03-12 RX ADMIN — CITALOPRAM HYDROBROMIDE 20 MG: 20 TABLET ORAL at 20:41

## 2018-03-12 RX ADMIN — Medication 10 ML: at 23:30

## 2018-03-12 RX ADMIN — TUBERCULIN PURIFIED PROTEIN DERIVATIVE 5 UNITS: 5 INJECTION, SOLUTION INTRADERMAL at 20:51

## 2018-03-12 RX ADMIN — MEMANTINE HYDROCHLORIDE 5 MG: 5 TABLET ORAL at 20:41

## 2018-03-12 RX ADMIN — SODIUM CHLORIDE 75 ML/HR: 900 INJECTION, SOLUTION INTRAVENOUS at 23:24

## 2018-03-12 RX ADMIN — LORATADINE 10 MG: 10 TABLET ORAL at 21:52

## 2018-03-12 RX ADMIN — PRAVASTATIN SODIUM 20 MG: 20 TABLET ORAL at 21:52

## 2018-03-12 NOTE — PROGRESS NOTES
Patient arrived to floor via wheelchair. Daughter at bedside. Alert to person, calm and cooperative.

## 2018-03-12 NOTE — H&P
HOSPITALIST H&P  NAME:  Enrike Garcias   Age:  80 y.o.  :   1935   MRN:   [id number]  PCP: Jw Jennings MD  Consulting MD:  Treatment Team: Attending Provider: Lucas Stafford. Tobias Lema MD  HPI:   Rachel Zaldivar is an 81 yo F with history of dementia, previous CVA, HTN, and recent admission for volvulus that was improved with a sigmoidoscopy on 18. She presented to the GI office today for follow up and abdomen severely distended. Dr. Carmen Scott ordered abdominal x-ray that was done at [facility] showing 'Marked gas distention of the numerous loops of mid to distal colon.  The cecum and ascending colon appear to be normal diameter.  There is air distending the distal sigmoid colon and rectum.'  Dr. Carmen Scott called hospitalist service for direct admission for further evaluation and to get surgical evaluation. Currently Ms. Burgess Oshea denies abdominal pain, but states it hurts 'sometimes.'  She has dementia and is an unreliable historian. According to her daughter, Rodrigo, who is at bedside, she has been eating/drinking okay at her [facility]. States patient has chronic diarrhea. Complete ROS done and is as stated in HPI or otherwise negative  Past Medical History:   Diagnosis Date    GERD (gastroesophageal reflux disease)     Other ill-defined conditions(799.89)     has frequent UTI's, polio    Polio     since age 3    Stroke ([facility] [address]     no side effects - TIA      Past Surgical History:   Procedure Laterality Date    ABDOMEN SURGERY PROC UNLISTED      hernia    FLEXIBLE SIGMOIDOSCOPY N/A 2018    SIGMOIDOSCOPY FLEXIBLE performed by Luis Manuel Durant MD at [facility] ENDOSCOPY    HX ADENOIDECTOMY      HX GI      hemorrhoidectomy    HX HYSTERECTOMY      HX ORTHOPAEDIC      right foot and leg x2    HX TONSILLECTOMY        Prior to Admission Medications   Prescriptions Last Dose Informant Patient Reported? Taking? Cetirizine (ZYRTEC) 10 mg Cap   Yes Yes   Sig: Take 10 mg by mouth nightly.      LORazepam (ATIVAN) 0.5 mg tablet   Yes Yes   Sig: Take 0.5 mg by mouth two (2) times daily as needed for Anxiety. aspirin delayed-release 81 mg tablet   Yes Yes   Sig: Take  by mouth daily. bisacodyl (DULCOLAX) 10 mg suppository   Yes Yes   Sig: Insert 10 mg into rectum daily as needed. citalopram (CELEXA) 20 mg tablet   Yes Yes   Sig: Take 20 mg by mouth every evening. dipyridamole (PERSANTINE) 50 mg tablet   Yes Yes   Sig: Take 50 mg by mouth two (2) times a day. Indications: Transient Cerebral Ischemia   divalproex DR (DEPAKOTE) 125 mg tablet   Yes Yes   Sig: Take 125 mg by mouth three (3) times daily as needed for Other (agitation). divalproex DR (DEPAKOTE) 125 mg tablet   Yes Yes   Sig: Take 125 mg by mouth daily. docusate sodium (COLACE) 100 mg capsule   Yes Yes   Sig: Take 100 mg by mouth two (2) times daily as needed. furosemide (LASIX) 20 mg tablet   Yes Yes   Sig: Take 30 mg by mouth daily. guaiFENesin (MUCINEX) 1,200 mg Ta12 ER tablet   Yes Yes   Sig: Take 1,200 mg by mouth two (2) times a day. lidocaine 5 % topical cream   Yes Yes   Sig: Apply  to affected area as directed. lisinopril (PRINIVIL, ZESTRIL) 20 mg tablet   Yes Yes   Sig: Take 10 mg by mouth daily. memantine (NAMENDA) 5 mg tablet   Yes Yes   Sig: Take 5 mg by mouth two (2) times a day. ondansetron hcl (ZOFRAN, AS HYDROCHLORIDE,) 4 mg tablet   Yes Yes   Sig: Take 4 mg by mouth every eight (8) hours as needed for Nausea. pravastatin (PRAVACHOL) 10 mg tablet   Yes Yes   Sig: Take 20 mg by mouth nightly. ranitidine (ZANTAC) 150 mg tablet   Yes Yes   Sig: Take 150 mg by mouth two (2) times a day. traMADol (ULTRAM) 50 mg tablet   Yes Yes   Sig: Take 25 mg by mouth every six (6) hours as needed for Pain.       Facility-Administered Medications: None     Allergies   Allergen Reactions    Amoxicillin Rash    Codeine Rash    Keflex [Cephalexin] Rash    Penicillins Unknown (comments)    Sulfa (Sulfonamide Antibiotics) Unknown (comments)      Social History   Substance Use Topics    Smoking status: Never Smoker    Smokeless tobacco: Never Used    Alcohol use No      Family History   Problem Relation Age of Onset    Heart Disease Mother     Lung Disease Father     Cancer Father       Objective: There were no vitals taken for this visit. Temp (24hrs), Av °F (-17.8 °C), Min:, Max:       Physical Exam:  General:    Alert, cooperative, no distress, elderly, oriented to self. Head:   Normocephalic, without obvious abnormality, atraumatic. Nose:  Nares normal. No drainage or sinus tenderness. Lungs:   Clear to auscultation bilaterally. No Wheezing or Rhonchi. No rales. Heart:   Regular rate and rhythm,  no murmur, rub or gallop. Abdomen:   Distended, tympanic, mild discomfort on palpation  Extremities: No cyanosis. No edema. No clubbing  Skin:     Texture, turgor normal. No rashes or lesions. Not Jaundiced  Neurologic: Alert and oriented to self, no focal deficits   Data Review:   No results found for this or any previous visit (from the past 24 hour(s)). Imaging /Procedures /Studies   Abdominal series at [address] on 3/12/18- Marked gas distention of the numerous loops of mid to distal colon.  The cecum and ascending colon appear to be normal diameter.  There is air distending the distal sigmoid colon and rectum. Assessment and Plan: Active Hospital Problems    Diagnosis Date Noted    Volvulus ([address] 2018    Dementia with behavioral disturbance 2018    Gaseous abdominal distention 2018    Weakness generalized 2012       PLAN  ·  Place in observation to medical bed. · NPO except meds. · Hold aspirin and dipyramidole in case surgery required. · Gentle IVF. · Consult general surgery for evaluation of volvulus as Dr. Shanell Holly states this is recurrent issues and colon extends into hiatal hernia. · Continue home meds.   · Will order low dose haldol prn for agitation as nursing states patient is starting to get severely agitated. Plan of care discussed with patient's daughter, Jeannine Sheets, at bedside. Code Status: Full    Anticipated discharge: 1-2 days    Signed By: Brett Smith MD     March 12, 2018      Addendum:  Spoke to Dr. Kylee Ellsworth (surgeon) regarding patient. He recommended to have GI evaluate for urgent decompression and to leave rectal tube in for bowel prep with possible sigmoidectomy in a few days. Consulted GI and discussed case with TRICIA Rizo, who will talk with Dr. Tabitha David about decompression.

## 2018-03-12 NOTE — IP AVS SNAPSHOT
[address] [facility] 
 
 
 [address] [place] [address] 
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  
When follow up with your doctor, make sure that your doctor is aware of this admission and review hospital record and follow up on lab results for continuity of care. Also, review your medications with your doctor for possible need of adjustment or refills. A check renetta indicates which time of day the medication should be taken. My Medications START taking these medications Instructions Each Dose to Equal  
 Morning Noon Evening Bedtime  
 potassium chloride 20 mEq tablet Commonly known as:  K-DUR, KLOR-CON Take 2 Tabs by mouth two (2) times a day for 2 doses. 40 mEq CHANGE how you take these medications Instructions Each Dose to Equal  
 Morning Noon Evening Bedtime DEPAKOTE 125 mg tablet Generic drug:  divalproex  What changed:  Another medication with the same name was removed. Continue taking this medication, and follow the directions you see here. Your next dose is: Take on as needed schedule Take 125 mg by mouth three (3) times daily as needed for Other (agitation). 125 mg CONTINUE taking these medications Instructions Each Dose to Equal  
 Morning Noon Evening Bedtime  
 aspirin delayed-release 81 mg tablet Your next dose is:  Tomorrow Morning Take  by mouth daily. ATIVAN 0.5 mg tablet Generic drug:  LORazepam  
   
 Take 0.5 mg by mouth two (2) times daily as needed for Anxiety. 0.5 mg  
    
   
   
   
  
 bisacodyl 10 mg suppository Commonly known as:  DULCOLAX Your next dose is: Take on as needed schedule Insert 10 mg into rectum daily as needed. 10 mg  
    
   
   
   
  
 CeleXA 20 mg tablet Generic drug:  citalopram  
Your next dose is:  Tomorrow Morning Take 20 mg by mouth every evening. 20 mg  
    
  
   
   
   
  
 COLACE 100 mg capsule Generic drug:  docusate sodium Your next dose is: Take on as needed schedule Take 100 mg by mouth two (2) times daily as needed. 100 mg  
    
   
   
   
  
 dipyridamole 50 mg tablet Commonly known as:  PERSANTINE Your next dose is: This evening Take 50 mg by mouth two (2) times a day. Indications: Transient Cerebral Ischemia 50 mg  
    
  
   
   
  
   
  
 LASIX 20 mg tablet Generic drug:  furosemide Your next dose is:  Tomorrow Morning Take 30 mg by mouth daily. 30 mg  
    
  
   
   
   
  
 lidocaine 5 % topical cream  
   
 Apply  to affected area as directed. lisinopril 20 mg tablet Commonly known as:  Loralie Hoffman Your next dose is:  Tomorrow Morning Take 10 mg by mouth daily. 10 mg  
    
  
   
   
   
  
 MUCINEX 1,200 mg Ta12 ER tablet Generic drug:  guaiFENesin Your next dose is: This evening Take 1,200 mg by mouth two (2) times a day. 1200 mg NAMENDA 5 mg tablet Generic drug:  memantine Your next dose is: This evening Take 5 mg by mouth two (2) times a day. 5 mg  
    
  
   
   
  
   
  
 pravastatin 10 mg tablet Commonly known as:  PRAVACHOL Your next dose is: This evening Take 20 mg by mouth nightly. 20 mg  
    
   
   
  
   
  
 raNITIdine 150 mg tablet Commonly known as:  ZANTAC Your next dose is: This evening Take 150 mg by mouth two (2) times a day. 150 mg ULTRAM 50 mg tablet Generic drug:  traMADol Your next dose is: Take on as needed schedule Take 25 mg by mouth every six (6) hours as needed for Pain. 25 mg  
    
   
   
   
  
 ZyrTEC 10 mg Cap Generic drug:  Cetirizine Your next dose is: This evening Take 10 mg by mouth nightly. 10 mg  
    
   
   
  
   
  
  
STOP taking these medications ZOFRAN (AS HYDROCHLORIDE) 4 mg tablet Generic drug:  ondansetron hcl Where to Get Your Medications Information on where to get these meds will be given to you by the nurse or doctor. ! Ask your nurse or doctor about these medications  
  potassium chloride 20 mEq tablet Discharge Instructions DISCHARGE SUMMARY from Nurse PATIENT INSTRUCTIONS: 
 
 
F-face looks uneven A-arms unable to move or move unevenly S-speech slurred or non-existent T-time-call 911 as soon as signs and symptoms begin-DO NOT go Back to bed or wait to see if you get better-TIME IS BRAIN. Warning Signs of HEART ATTACK Call 911 if you have these symptoms: 
? Chest discomfort.  Most heart attacks involve discomfort in the center of the chest that lasts more than a few minutes, or that goes away and comes back. It can feel like uncomfortable pressure, squeezing, fullness, or pain. ? Discomfort in other areas of the upper body. Symptoms can include pain or discomfort in one or both arms, the back, neck, jaw, or stomach. ? Shortness of breath with or without chest discomfort. ? Other signs may include breaking out in a cold sweat, nausea, or lightheadedness. Don't wait more than five minutes to call [address]! Fast action can save your life. Calling 911 is almost always the fastest way to get lifesaving treatment. Emergency Medical Services staff can begin treatment when they arrive  up to an hour sooner than if someone gets to the hospital by car. The discharge information has been reviewed with the patient. The patient verbalized understanding. Discharge medications reviewed with the patient and appropriate educational materials and side effects teaching were provided. ___________________________________________________________________________________________________________________________________ [employer] Announcement We are excited to announce that we are making your provider's discharge notes available to you in Lakoo. You will see these notes when they are completed and signed by the physician that discharged you from your recent hospital stay. If you have any questions or concerns about any information you see in Lakoo, please call the Health Information Department where you were seen or reach out to your Primary Care Provider for more information about your plan of care. Introducing \[facility] [place] [facility]\"" & [facility] SERVICES! Woody Delgado introduces [employer] patient portal. Now you can access parts of your medical record, email your doctor's office, and request medication refills online. 1. In your internet browser, go to [email or web address] 2. Click on the First Time User? Click Here link in the Sign In box. You will see the New Member Sign Up page. 3. Enter your [employer] Access Code exactly as it appears below. You will not need to use this code after youve completed the sign-up process. If you do not sign up before the expiration date, you must request a new code. · [employer] Access Code: [email or web address] Expires: 5/17/2018  2:34 AM 
 
4. Enter the last four digits of your Social Security Number (xxxx) and Date of Birth (mm/dd/yyyy) as indicated and click Submit. You will be taken to the next sign-up page. 5. Create a [employer] ID. This will be your [employer] login ID and cannot be changed, so think of one that is secure and easy to remember. 6. Create a [employer] password. You can change your password at any time. 7. Enter your Password Reset Question and Answer. This can be used at a later time if you forget your password. 8. Enter your e-mail address. You will receive e-mail notification when new information is available in [address] 9. Click Sign Up. You can now view and download portions of your medical record. 10. Click the Download Summary menu link to download a portable copy of your medical information. If you have questions, please visit the Frequently Asked Questions section of the [employer] website. Remember, [employer] is NOT to be used for urgent needs. For medical emergencies, dial 911. Now available from your iPhone and Android! Providers Seen During Your Hospitalization Provider Specialty Primary office phone Bartolo Dean, [address] [facility] [phone] Immunizations Administered for This Admission Name Date  
 TB Skin Test (PPD) Intradermal  Deferred (), 3/12/2018 Your Primary Care Physician (PCP) Primary Care Physician Office Phone Office Fax OTHER, PHYS ** None ** ** None ** You are allergic to the following Allergen Reactions Amoxicillin Rash Codeine Rash Keflex (Cephalexin) Rash Penicillins Unknown (comments) Sulfa (Sulfonamide Antibiotics) Unknown (comments) Recent Documentation Breastfeeding? OB Status Smoking Status No Hysterectomy Never Smoker Emergency Contacts Name Discharge Info Relation Home Work Mobile Richy Howard  Spouse [3] [phone] Monica Howard  Child [2] [phone] Ya Prado  Child [2] [phone] Patient Belongings The following personal items are in your possession at time of discharge: 
  Dental Appliances: None  Visual Aid: Glasses      Home Medications: None   Jewelry: Necklace, Ring, Bracelet  Clothing: Footwear, Shirt, Sweater, Undergarments, Socks, Pants    Other Valuables: Wheelchair  Personal Items Sent to Safe: none Discharge Instructions Attachments/References ABDOMINAL PAIN (ENGLISH) Patient Handouts Abdominal Pain: Care Instructions Your Care Instructions Abdominal pain has many possible causes. Some aren't serious and get better on their own in a few days. Others need more testing and treatment. If your pain continues or gets worse, you need to be rechecked and may need more tests to find out what is wrong. You may need surgery to correct the problem. Don't ignore new symptoms, such as fever, nausea and vomiting, urination problems, pain that gets worse, and dizziness. These may be signs of a more serious problem. Your doctor may have recommended a follow-up visit in the next 8 to 12 hours. If you are not getting better, you may need more tests or treatment. The doctor has checked you carefully, but problems can develop later. If you notice any problems or new symptoms, get medical treatment right away. Follow-up care is a key part of your treatment and safety.  Be sure to make and go to all appointments, and call your doctor if you are having problems. It's also a good idea to know your test results and keep a list of the medicines you take. How can you care for yourself at home? · Rest until you feel better. · To prevent dehydration, drink plenty of fluids, enough so that your urine is light yellow or clear like water. Choose water and other caffeine-free clear liquids until you feel better. If you have kidney, heart, or liver disease and have to limit fluids, talk with your doctor before you increase the amount of fluids you drink. · If your stomach is upset, eat mild foods, such as rice, dry toast or crackers, bananas, and applesauce. Try eating several small meals instead of two or three large ones. · Wait until 48 hours after all symptoms have gone away before you have spicy foods, alcohol, and drinks that contain caffeine. · Do not eat foods that are high in fat. · Avoid anti-inflammatory medicines such as aspirin, ibuprofen (Advil, Motrin), and naproxen (Aleve). These can cause stomach upset. Talk to your doctor if you take daily aspirin for another health problem. When should you call for help? Call 911 anytime you think you may need emergency care. For example, call if: 
? · You passed out (lost consciousness). ? · You pass maroon or very bloody stools. ? · You vomit blood or what looks like coffee grounds. ? · You have new, severe belly pain. ?Call your doctor now or seek immediate medical care if: 
? · Your pain gets worse, especially if it becomes focused in one area of your belly. ? · You have a new or higher fever. ? · Your stools are black and look like tar, or they have streaks of blood. ? · You have unexpected vaginal bleeding. ? · You have symptoms of a urinary tract infection. These may include: 
¨ Pain when you urinate. ¨ Urinating more often than usual. 
¨ Blood in your urine. ? · You are dizzy or lightheaded, or you feel like you may faint. ?Watch closely for changes in your health, and be sure to contact your doctor if: 
? · You are not getting better after 1 day (24 hours). Where can you learn more? Go to http://israel-stuart.info/. Enter M810 in the search box to learn more about \"Abdominal Pain: Care Instructions. \" Current as of: March 20, 2017 Content Version: 11.4 © 2006-2017 Healthwise[employer]. Care instructions adapted under license by [employer] (which disclaims liability or warranty for this information). If you have questions about a medical condition or this instruction, always ask your healthcare professional. [address] any warranty or liability for your use of this information. Please provide this summary of care documentation to your next provider. Signatures-by signing, you are acknowledging that this After Visit Summary has been reviewed with you and you have received a copy. Patient Signature:  ____________________________________________________________ Date:  ____________________________________________________________  
  
Zechariah Aragon Provider Signature:  ____________________________________________________________ Date:  ____________________________________________________________

## 2018-03-12 NOTE — IP AVS SNAPSHOT
[address] 
[phone] Patient: Skip Nieto MRN: [id number] :[id number] A check renetta indicates which time of day the medication should be taken. My Medications START taking these medications Instructions Each Dose to Equal  
 Morning Noon Evening Bedtime  
 potassium chloride 20 mEq tablet Commonly known as:  K-DUR, KLOR-CON Take 2 Tabs by mouth two (2) times a day for 2 doses. 40 mEq CHANGE how you take these medications Instructions Each Dose to Equal  
 Morning Noon Evening Bedtime DEPAKOTE 125 mg tablet Generic drug:  divalproex  What changed:  Another medication with the same name was removed. Continue taking this medication, and follow the directions you see here. Your next dose is: Take on as needed schedule Take 125 mg by mouth three (3) times daily as needed for Other (agitation). 125 mg CONTINUE taking these medications Instructions Each Dose to Equal  
 Morning Noon Evening Bedtime  
 aspirin delayed-release 81 mg tablet Your next dose is:  Tomorrow Morning Take  by mouth daily. ATIVAN 0.5 mg tablet Generic drug:  LORazepam  
   
 Take 0.5 mg by mouth two (2) times daily as needed for Anxiety. 0.5 mg  
    
   
   
   
  
 bisacodyl 10 mg suppository Commonly known as:  DULCOLAX Your next dose is: Take on as needed schedule Insert 10 mg into rectum daily as needed. 10 mg  
    
   
   
   
  
 CeleXA 20 mg tablet Generic drug:  citalopram  
Your next dose is:  Tomorrow Morning Take 20 mg by mouth every evening. 20 mg  
    
  
   
   
   
  
 COLACE 100 mg capsule Generic drug:  docusate sodium Your next dose is: Take on as needed schedule Take 100 mg by mouth two (2) times daily as needed.   
 100 mg  
    
 dipyridamole 50 mg tablet Commonly known as:  PERSANTINE Your next dose is: This evening Take 50 mg by mouth two (2) times a day. Indications: Transient Cerebral Ischemia 50 mg  
    
  
   
   
  
   
  
 LASIX 20 mg tablet Generic drug:  furosemide Your next dose is:  Tomorrow Morning Take 30 mg by mouth daily. 30 mg  
    
  
   
   
   
  
 lidocaine 5 % topical cream  
   
 Apply  to affected area as directed. lisinopril 20 mg tablet Commonly known as:  Loza Titi Your next dose is:  Tomorrow Morning Take 10 mg by mouth daily. 10 mg  
    
  
   
   
   
  
 MUCINEX 1,200 mg Ta12 ER tablet Generic drug:  guaiFENesin Your next dose is: This evening Take 1,200 mg by mouth two (2) times a day. 1200 mg NAMENDA 5 mg tablet Generic drug:  memantine Your next dose is: This evening Take 5 mg by mouth two (2) times a day. 5 mg  
    
  
   
   
  
   
  
 pravastatin 10 mg tablet Commonly known as:  PRAVACHOL Your next dose is: This evening Take 20 mg by mouth nightly. 20 mg  
    
   
   
  
   
  
 raNITIdine 150 mg tablet Commonly known as:  ZANTAC Your next dose is: This evening Take 150 mg by mouth two (2) times a day. 150 mg ULTRAM 50 mg tablet Generic drug:  traMADol Your next dose is: Take on as needed schedule Take 25 mg by mouth every six (6) hours as needed for Pain. 25 mg  
    
   
   
   
  
 ZyrTEC 10 mg Cap Generic drug:  Cetirizine Your next dose is: This evening Take 10 mg by mouth nightly. 10 mg  
    
   
   
  
   
  
  
STOP taking these medications ZOFRAN (AS HYDROCHLORIDE) 4 mg tablet Generic drug:  ondansetron hcl Where to Get Your Medications Information on where to get these meds will be given to you by the nurse or doctor. ! Ask your nurse or doctor about these medications  
  potassium chloride 20 mEq tablet

## 2018-03-12 NOTE — IP AVS SNAPSHOT
Summary of Care Report The Summary of Care report has been created to help improve care coordination. Users with access to [employer] or [address] (Web-based application) may access additional patient information including the Discharge Summary. If you are not currently a [address] user and need more information, please call the number listed below in the [place] [address] section and ask to be connected with Medical Records. Facility Information Name Address Phone [address] [address] [address] [phone] Patient Information Patient Name Sex SURY Albarran ([id number]) Female 1935 Discharge Information Admitting Provider Service Area Unit Annie Rater Maik Delarosa MD / [address] 2 Surgical / [phone] Discharge Provider Discharge Date/Time Discharge Disposition Destination (none) 3/14/2018 (Pending) AHR (none) Patient Language Language ENGLISH [13] Hospital Problems as of 3/14/2018  Reviewed: 3/13/2018 10:22 AM by Roger Hollis MD  
  
  
  
 Class Noted - Resolved Last Modified POA Active Problems Weakness generalized  2012 - Present 3/12/2018 by Umesh Vergara. Maik Delarosa MD Yes Entered by Terrell Valentine MD  
  * (Principal)Volvulus [facility])  3/12/2018 - Present 3/12/2018 by Umesh Vergara. Maik Delarosa MD Yes Entered by Annie Rater Maik Delarosa MD  
  Dementia with behavioral disturbance  3/12/2018 - Present 3/12/2018 by Umesh Vergara. Maik Delarosa MD Yes Entered by Ellvipul Rater Maik Delarosa MD  
  Gaseous abdominal distention  3/12/2018 - Present 3/12/2018 by Umesh Vergara. Maik Delarosa MD Yes Entered by Annie Rater Maik Delarosa MD  
  
Non-Hospital Problems as of 3/14/2018  Reviewed: 3/13/2018 10:22 AM by Roger Hollis MD  
  
  
  
 Class Noted - Resolved Last Modified Active Problems Hyponatremia  7/24/2012 - Present 7/26/2012 Entered by Sherry Estrada MD  
  Hypokalemia  7/24/2012 - Present 7/26/2012 Entered by Sherry Estrada MD  
  Acute encephalopathy  7/24/2012 - Present 7/26/2012 Entered by Sherry Estrada MD  
  Pyuria  7/24/2012 - Present 7/26/2012 Entered by Sherry Estrada MD  
  Polio (Chronic)  7/24/2012 - Present 7/26/2012 Entered by Sherry Estrada MD  
  Esophageal reflux  7/24/2012 - Present 7/26/2012 Entered by Sherry Estrada MD  
  Volvulus of sigmoid colon [facility])  2/16/2018 - Present 2/17/2018 by Lashonda Madrid MD  
  Entered by Sonya Rogel MD  
  
You are allergic to the following Allergen Reactions Amoxicillin Rash Codeine Rash Keflex (Cephalexin) Rash Penicillins Unknown (comments) Sulfa (Sulfonamide Antibiotics) Unknown (comments) Current Discharge Medication List  
  
START taking these medications Dose & Instructions Dispensing Information Comments  
 potassium chloride 20 mEq tablet Commonly known as:  K-DUR, KLOR-CON Dose:  40 mEq Take 2 Tabs by mouth two (2) times a day for 2 doses. Quantity:  4 Tab Refills:  0 CONTINUE these medications which have CHANGED Dose & Instructions Dispensing Information Comments DEPAKOTE 125 mg tablet Generic drug:  divalproex  What changed:  Another medication with the same name was removed. Continue taking this medication, and follow the directions you see here. Dose:  125 mg Take 125 mg by mouth three (3) times daily as needed for Other (agitation). Refills:  0 CONTINUE these medications which have NOT CHANGED Dose & Instructions Dispensing Information Comments  
 aspirin delayed-release 81 mg tablet Take  by mouth daily. Refills:  0  
   
 ATIVAN 0.5 mg tablet Generic drug:  LORazepam  
 Dose:  0.5 mg  
 Take 0.5 mg by mouth two (2) times daily as needed for Anxiety. Refills:  0  
   
 bisacodyl 10 mg suppository Commonly known as:  DULCOLAX Dose:  10 mg Insert 10 mg into rectum daily as needed. Refills:  0 CeleXA 20 mg tablet Generic drug:  citalopram  
 Dose:  20 mg Take 20 mg by mouth every evening. Refills:  0  
   
 COLACE 100 mg capsule Generic drug:  docusate sodium Dose:  100 mg Take 100 mg by mouth two (2) times daily as needed. Refills:  0  
   
 dipyridamole 50 mg tablet Commonly known as:  PERSANTINE Dose:  50 mg Take 50 mg by mouth two (2) times a day. Indications: Transient Cerebral Ischemia Refills:  0  
   
 LASIX 20 mg tablet Generic drug:  furosemide Dose:  30 mg Take 30 mg by mouth daily. Refills:  0  
   
 lidocaine 5 % topical cream  
 Apply  to affected area as directed. Refills:  0  
   
 lisinopril 20 mg tablet Commonly known as:  Zahra Fair Dose:  10 mg Take 10 mg by mouth daily. Refills:  0 MUCINEX 1,200 mg Ta12 ER tablet Generic drug:  guaiFENesin Dose:  1200 mg Take 1,200 mg by mouth two (2) times a day. Refills:  0  
   
 NAMENDA 5 mg tablet Generic drug:  memantine Dose:  5 mg Take 5 mg by mouth two (2) times a day. Refills:  0  
   
 pravastatin 10 mg tablet Commonly known as:  PRAVACHOL Dose:  20 mg Take 20 mg by mouth nightly. Refills:  0  
   
 raNITIdine 150 mg tablet Commonly known as:  ZANTAC Dose:  150 mg Take 150 mg by mouth two (2) times a day. Refills:  0  
   
 ULTRAM 50 mg tablet Generic drug:  traMADol Dose:  25 mg Take 25 mg by mouth every six (6) hours as needed for Pain. Refills:  0 ZyrTEC 10 mg Cap Generic drug:  Cetirizine Dose:  10 mg Take 10 mg by mouth nightly. Refills:  0 STOP taking these medications Comments ZOFRAN (AS HYDROCHLORIDE) 4 mg tablet Generic drug:  ondansetron hcl Current Immunizations Name Date  
 TB Skin Test (PPD) Intradermal  Deferred (), 3/12/2018 ZZZ-RETIRED (DO NOT USE) Pneumococcal Vaccine (Unspecified Type) 7/25/2008 Surgery Information ID Date/Time Status Primary Surgeon All Procedures Location [id number] 3/13/2018  1:00 PM Unposted Mariluz Spear MD SIGMOIDOSCOPY FLEXIBLE 
COLON DECOMPRESSION [facility] ENDOSCOPY Follow-up Information Follow up With Details Comments Contact Tierney Fitzgerald MD  follow up with [facility] P[facility] at [facility]  Patient can only remember the practice name and not the physician Discharge Instructions DISCHARGE SUMMARY from Nurse PATIENT INSTRUCTIONS: 
 
 
F-face looks uneven A-arms unable to move or move unevenly S-speech slurred or non-existent T-time-call 911 as soon as signs and symptoms begin-DO NOT go Back to bed or wait to see if you get better-TIME IS BRAIN. Warning Signs of HEART ATTACK Call 911 if you have these symptoms: 
? Chest discomfort. Most heart attacks involve discomfort in the center of the chest that lasts more than a few minutes, or that goes away and comes back. It can feel like uncomfortable pressure, squeezing, fullness, or pain. ? Discomfort in other areas of the upper body. Symptoms can include pain or discomfort in one or both arms, the back, neck, jaw, or stomach. ? Shortness of breath with or without chest discomfort. ? Other signs may include breaking out in a cold sweat, nausea, or lightheadedness. Don't wait more than five minutes to call [address]! Fast action can save your life.  Calling 911 is almost always the fastest way to get lifesaving treatment. Emergency Medical Services staff can begin treatment when they arrive  up to an hour sooner than if someone gets to the hospital by car. The discharge information has been reviewed with the patient. The patient verbalized understanding. Discharge medications reviewed with the patient and appropriate educational materials and side effects teaching were provided. ___________________________________________________________________________________________________________________________________ Chart Review Routing History Recipient Method Report Sent By Aster Woods MD  
Fax: [phone] Phone: [phone] Fax IP Auto Routed Tiffani Muse MD [[id number]] 2/16/2018  3:58 PM 02/16/2018 Ashly Mallory MD  
Fax: [phone] Phone: [phone] Fax IP Auto Routed Tiffani Muse MD [[id number]] 2/16/2018  3:58 PM 02/16/2018

## 2018-03-13 ENCOUNTER — ANESTHESIA (OUTPATIENT)
Dept: ENDOSCOPY | Age: 83
DRG: 345 | End: 2018-03-13
Payer: MEDICARE

## 2018-03-13 ENCOUNTER — ANESTHESIA EVENT (OUTPATIENT)
Dept: ENDOSCOPY | Age: 83
DRG: 345 | End: 2018-03-13
Payer: MEDICARE

## 2018-03-13 LAB
ANION GAP SERPL CALC-SCNC: 12 MMOL/L (ref 7–16)
BASOPHILS # BLD: 0.1 K/UL (ref 0–0.2)
BASOPHILS NFR BLD: 1 % (ref 0–2)
BUN SERPL-MCNC: 10 MG/DL (ref 8–23)
CALCIUM SERPL-MCNC: 8.6 MG/DL (ref 8.3–10.4)
CHLORIDE SERPL-SCNC: 105 MMOL/L (ref 98–107)
CO2 SERPL-SCNC: 25 MMOL/L (ref 21–32)
CREAT SERPL-MCNC: 0.62 MG/DL (ref 0.6–1)
DIFFERENTIAL METHOD BLD: ABNORMAL
EOSINOPHIL # BLD: 0.2 K/UL (ref 0–0.8)
EOSINOPHIL NFR BLD: 3 % (ref 0.5–7.8)
ERYTHROCYTE [DISTWIDTH] IN BLOOD BY AUTOMATED COUNT: 14.6 % (ref 11.9–14.6)
GLUCOSE SERPL-MCNC: 104 MG/DL (ref 65–100)
HCT VFR BLD AUTO: 38.5 % (ref 35.8–46.3)
HGB BLD-MCNC: 13 G/DL (ref 11.7–15.4)
IMM GRANULOCYTES # BLD: 0 K/UL (ref 0–0.5)
IMM GRANULOCYTES NFR BLD AUTO: 0 % (ref 0–5)
LYMPHOCYTES # BLD: 1.5 K/UL (ref 0.5–4.6)
LYMPHOCYTES NFR BLD: 23 % (ref 13–44)
MCH RBC QN AUTO: 31.6 PG (ref 26.1–32.9)
MCHC RBC AUTO-ENTMCNC: 33.8 G/DL (ref 31.4–35)
MCV RBC AUTO: 93.4 FL (ref 79.6–97.8)
MM INDURATION POC: 0 MM (ref 0–5)
MONOCYTES # BLD: 0.8 K/UL (ref 0.1–1.3)
MONOCYTES NFR BLD: 13 % (ref 4–12)
NEUTS SEG # BLD: 4 K/UL (ref 1.7–8.2)
NEUTS SEG NFR BLD: 60 % (ref 43–78)
PLATELET # BLD AUTO: 142 K/UL (ref 150–450)
PMV BLD AUTO: 10.6 FL (ref 10.8–14.1)
POTASSIUM SERPL-SCNC: 2.8 MMOL/L (ref 3.5–5.1)
POTASSIUM SERPL-SCNC: 3.4 MMOL/L (ref 3.5–5.1)
PPD POC: NORMAL NEGATIVE
RBC # BLD AUTO: 4.12 M/UL (ref 4.05–5.25)
SODIUM SERPL-SCNC: 142 MMOL/L (ref 136–145)
WBC # BLD AUTO: 6.6 K/UL (ref 4.3–11.1)

## 2018-03-13 PROCEDURE — 99218 HC RM OBSERVATION: CPT

## 2018-03-13 PROCEDURE — 74011250636 HC RX REV CODE- 250/636: Performed by: ANESTHESIOLOGY

## 2018-03-13 PROCEDURE — 74011000250 HC RX REV CODE- 250

## 2018-03-13 PROCEDURE — 80048 BASIC METABOLIC PNL TOTAL CA: CPT | Performed by: INTERNAL MEDICINE

## 2018-03-13 PROCEDURE — 77030032490 HC SLV COMPR SCD KNE COVD -B

## 2018-03-13 PROCEDURE — 74011250636 HC RX REV CODE- 250/636: Performed by: INTERNAL MEDICINE

## 2018-03-13 PROCEDURE — 74011250637 HC RX REV CODE- 250/637: Performed by: INTERNAL MEDICINE

## 2018-03-13 PROCEDURE — 0D9N8ZZ DRAINAGE OF SIGMOID COLON, VIA NATURAL OR ARTIFICIAL OPENING ENDOSCOPIC: ICD-10-PCS | Performed by: INTERNAL MEDICINE

## 2018-03-13 PROCEDURE — 74011250636 HC RX REV CODE- 250/636

## 2018-03-13 PROCEDURE — 84132 ASSAY OF SERUM POTASSIUM: CPT | Performed by: NURSE PRACTITIONER

## 2018-03-13 PROCEDURE — 85025 COMPLETE CBC W/AUTO DIFF WBC: CPT | Performed by: INTERNAL MEDICINE

## 2018-03-13 PROCEDURE — 76040000025: Performed by: INTERNAL MEDICINE

## 2018-03-13 PROCEDURE — 36415 COLL VENOUS BLD VENIPUNCTURE: CPT | Performed by: INTERNAL MEDICINE

## 2018-03-13 PROCEDURE — 76060000031 HC ANESTHESIA FIRST 0.5 HR: Performed by: INTERNAL MEDICINE

## 2018-03-13 RX ORDER — SODIUM CHLORIDE, SODIUM LACTATE, POTASSIUM CHLORIDE, CALCIUM CHLORIDE 600; 310; 30; 20 MG/100ML; MG/100ML; MG/100ML; MG/100ML
100 INJECTION, SOLUTION INTRAVENOUS CONTINUOUS
Status: DISCONTINUED | OUTPATIENT
Start: 2018-03-13 | End: 2018-03-14 | Stop reason: HOSPADM

## 2018-03-13 RX ORDER — SODIUM CHLORIDE, SODIUM LACTATE, POTASSIUM CHLORIDE, CALCIUM CHLORIDE 600; 310; 30; 20 MG/100ML; MG/100ML; MG/100ML; MG/100ML
100 INJECTION, SOLUTION INTRAVENOUS CONTINUOUS
Status: CANCELLED | OUTPATIENT
Start: 2018-03-13

## 2018-03-13 RX ORDER — LIDOCAINE HYDROCHLORIDE 20 MG/ML
INJECTION, SOLUTION EPIDURAL; INFILTRATION; INTRACAUDAL; PERINEURAL AS NEEDED
Status: DISCONTINUED | OUTPATIENT
Start: 2018-03-13 | End: 2018-03-13 | Stop reason: HOSPADM

## 2018-03-13 RX ORDER — POTASSIUM CHLORIDE 20 MEQ/1
40 TABLET, EXTENDED RELEASE ORAL DAILY
Status: DISCONTINUED | OUTPATIENT
Start: 2018-03-13 | End: 2018-03-13

## 2018-03-13 RX ORDER — PROPOFOL 10 MG/ML
INJECTION, EMULSION INTRAVENOUS AS NEEDED
Status: DISCONTINUED | OUTPATIENT
Start: 2018-03-13 | End: 2018-03-13 | Stop reason: HOSPADM

## 2018-03-13 RX ORDER — POTASSIUM CHLORIDE 14.9 MG/ML
20 INJECTION INTRAVENOUS
Status: DISCONTINUED | OUTPATIENT
Start: 2018-03-13 | End: 2018-03-13

## 2018-03-13 RX ORDER — SODIUM CHLORIDE 0.9 % (FLUSH) 0.9 %
5-10 SYRINGE (ML) INJECTION AS NEEDED
Status: CANCELLED | OUTPATIENT
Start: 2018-03-13

## 2018-03-13 RX ORDER — POTASSIUM CHLORIDE 14.9 MG/ML
20 INJECTION INTRAVENOUS
Status: DISCONTINUED | OUTPATIENT
Start: 2018-03-13 | End: 2018-03-13 | Stop reason: RX

## 2018-03-13 RX ADMIN — FAMOTIDINE 40 MG: 20 TABLET, FILM COATED ORAL at 08:22

## 2018-03-13 RX ADMIN — PRAVASTATIN SODIUM 20 MG: 20 TABLET ORAL at 22:20

## 2018-03-13 RX ADMIN — SODIUM CHLORIDE: 900 INJECTION, SOLUTION INTRAVENOUS at 16:30

## 2018-03-13 RX ADMIN — SODIUM CHLORIDE: 900 INJECTION, SOLUTION INTRAVENOUS at 09:10

## 2018-03-13 RX ADMIN — PROPOFOL 20 MG: 10 INJECTION, EMULSION INTRAVENOUS at 12:08

## 2018-03-13 RX ADMIN — Medication 10 ML: at 14:00

## 2018-03-13 RX ADMIN — LIDOCAINE HYDROCHLORIDE 60 MG: 20 INJECTION, SOLUTION EPIDURAL; INFILTRATION; INTRACAUDAL; PERINEURAL at 12:08

## 2018-03-13 RX ADMIN — CITALOPRAM HYDROBROMIDE 20 MG: 20 TABLET ORAL at 17:21

## 2018-03-13 RX ADMIN — SODIUM CHLORIDE, SODIUM LACTATE, POTASSIUM CHLORIDE, AND CALCIUM CHLORIDE: 600; 310; 30; 20 INJECTION, SOLUTION INTRAVENOUS at 12:03

## 2018-03-13 RX ADMIN — LORATADINE 10 MG: 10 TABLET ORAL at 22:21

## 2018-03-13 RX ADMIN — LISINOPRIL 10 MG: 5 TABLET ORAL at 08:22

## 2018-03-13 RX ADMIN — MEMANTINE HYDROCHLORIDE 5 MG: 5 TABLET ORAL at 08:22

## 2018-03-13 RX ADMIN — MEMANTINE HYDROCHLORIDE 5 MG: 5 TABLET ORAL at 17:21

## 2018-03-13 RX ADMIN — SODIUM CHLORIDE 75 ML/HR: 900 INJECTION, SOLUTION INTRAVENOUS at 09:25

## 2018-03-13 NOTE — PROCEDURES
COLONOSCOPY    DATE of PROCEDURE: 3/13/2018    MEDICATION:  MAC      INSTRUMENT: PCF    PROCEDURE: After obtaining informed consent, the patient was placed in the left lateral position and sedated. The endoscope was advanced to the transverese colon. On withdrawal, the colon was carefully visualized in a 360 degree fashion. Retroflexion was not performed in the rectum. The patient was taken to the recovery area in stable condition. FINDINGS:  Immediately upon entry into the rectum a dilated colon noted and suctioning cause large amount of expulsion of air and liquid stool. The colonoscope traversed and reached what appeared to be the descending/distal transverse colon approximately 60 cm from entry. There was no clear twist in the distal sigmoid colon but dilated colon was suctioned successfully. No clear transition point noted. There was a large amount of air that was suctioned as well as stool that was suctioned, air and stool suctioned to the rectosigmoid area.  Following this, the abdomen was much less distended.      Colonic mucosa looked healthy throughout. Estimated blood loss: 0-minimal         ASSESSMENT/PLAN:  1. Clear diet  2. Advance as tolerated.         Waqas Erazo MD  [facility] [place]

## 2018-03-13 NOTE — PROGRESS NOTES
Spoke briefly to Ms. Felipa Musa and her daughter in room 214 about discharge planning. Ms. Felipa Musa is under observation status for possible volvulus. She just returned from GI lab after a flex sig colonoscopy. Discharge plan is to return to [facility] assisted living facility where Ms. Fleipa Musa lives with her . No discharge needs identified.

## 2018-03-13 NOTE — H&P (VIEW-ONLY)
[facility] Consult Note       Consulted GI Physician: Dr. Sweta Jorge    Referring Physician:  Dr. Vitor Llanes Date:  3/12/2018    Admit Date:  3/12/2018    Chief Complaint:  Abdominal distention ?volvulus    Subjective:     History of Present Illness:  Patient is a 80 y.o. female with PMH of CVA, dementia, HTN, HLD, GERD who is seen in consultation at the request of Dr. Perry Wilcox for abdominal distention with questionable volvulus. Pt had sigmoid volvulus in Feb 2018 that resolved with flex sig/decompression on 2/1/6/18. She was seen in office follow up today and distended abdomen with high pitched bowel sounds were noted. She was sent for Abdominal films showing distal colonic distention (full report below) similar to findings on CT 2/16/18. She was referred to Hospitalists for admission and surgical consultation. Per Dr. Ella Hollins, colonic decompression with insertion of rectal tube should precede bowel prep/ surgery. Pt is an unreliable historian but denies abdominal pain. There has been no N/V, changes in bowel habits, hematochezia, or melena reported. Abdominal series 03/12/2018. Marked gas distention of the numerous loops of mid to distal colon.  The cecum and ascending colon appear to be normal diameter.  There is air distending the distal sigmoid colon and rectum. Review of CT scan performed elsewhere 02/16/2018 documents a very similar bowel gas pattern.  There is a partial twist of the sigmoid colon on the CT scan with distention of the colon proximal to that point but some distention of the distal sigmoid and rectum as well, in a pattern very similar today's exam. Persistent suspicion of partial volvulus versus adhesion producing partial distal colonic obstruction at that site. No evidence of free air.  1 of the air-filled colon loops extends into the large hiatal hernia that projects over the cardiac silhouette.  That was present on 02/16/2018 as well.  Low lung volumes with atelectasis in the lung bases. PMH:  Past Medical History:   Diagnosis Date    GERD (gastroesophageal reflux disease)     Other ill-defined conditions(799.89)     has frequent UTI's, polio    Polio     since age 3    Stroke ([facility] [address] 2011    no side effects - TIA       PSH:  Past Surgical History:   Procedure Laterality Date    ABDOMEN SURGERY PROC UNLISTED      hernia    FLEXIBLE SIGMOIDOSCOPY N/A 2/16/2018    SIGMOIDOSCOPY FLEXIBLE performed by Kael Dale [facility] at [facility] ENDOSCOPY    HX ADENOIDECTOMY      HX GI      hemorrhoidectomy    HX HYSTERECTOMY      HX ORTHOPAEDIC      right foot and leg x2    HX TONSILLECTOMY         Allergies: Allergies   Allergen Reactions    Amoxicillin Rash    Codeine Rash    Keflex [Cephalexin] Rash    Penicillins Unknown (comments)    Sulfa (Sulfonamide Antibiotics) Unknown (comments)       Home Medications:  Prior to Admission medications    Medication Sig Start Date End Date Taking? Authorizing Provider   divalproex DR (DEPAKOTE) 125 mg tablet Take 125 mg by mouth three (3) times daily as needed for Other (agitation). Yes Historical Provider   LORazepam (ATIVAN) 0.5 mg tablet Take 0.5 mg by mouth two (2) times daily as needed for Anxiety. Yes Historical Provider   guaiFENesin (MUCINEX) 1,200 mg Ta12 ER tablet Take 1,200 mg by mouth two (2) times a day. Yes Historical Provider   ondansetron hcl (ZOFRAN, AS HYDROCHLORIDE,) 4 mg tablet Take 4 mg by mouth every eight (8) hours as needed for Nausea. Yes Historical Provider   traMADol (ULTRAM) 50 mg tablet Take 25 mg by mouth every six (6) hours as needed for Pain. Yes Historical Provider   citalopram (CELEXA) 20 mg tablet Take 20 mg by mouth every evening. Yes Historical Provider   divalproex DR (DEPAKOTE) 125 mg tablet Take 125 mg by mouth daily. Yes Historical Provider   furosemide (LASIX) 20 mg tablet Take 30 mg by mouth daily.    Yes Historical Provider   memantine (NAMENDA) 5 mg tablet Take 5 mg by mouth two (2) times a day. Yes Historical Provider   aspirin delayed-release 81 mg tablet Take  by mouth daily. Yes Nehemiah Fitzgerald MD   docusate sodium (COLACE) 100 mg capsule Take 100 mg by mouth two (2) times daily as needed. Yes Nehemiah Fitzgerald MD   lidocaine 5 % topical cream Apply  to affected area as directed. Yes Nehemiah Fitzgerald MD   bisacodyl (DULCOLAX) 10 mg suppository Insert 10 mg into rectum daily as needed. Yes Nehemiah Fitzgerald MD   pravastatin (PRAVACHOL) 10 mg tablet Take 20 mg by mouth nightly. Yes Historical Provider   lisinopril (PRINIVIL, ZESTRIL) 20 mg tablet Take 10 mg by mouth daily. Yes Historical Provider   ranitidine (ZANTAC) 150 mg tablet Take 150 mg by mouth two (2) times a day. Yes Historical Provider   dipyridamole (PERSANTINE) 50 mg tablet Take 50 mg by mouth two (2) times a day. Indications: Transient Cerebral Ischemia   Yes Historical Provider   Cetirizine (ZYRTEC) 10 mg Cap Take 10 mg by mouth nightly.      Yes Historical Provider       Hospital Medications:  Current Facility-Administered Medications   Medication Dose Route Frequency    bisacodyl (DULCOLAX) suppository 10 mg  10 mg Rectal DAILY PRN    loratadine (CLARITIN) tablet 10 mg  10 mg Oral QHS    citalopram (CELEXA) tablet 20 mg  20 mg Oral QPM    dipyridamole (PERSANTINE) tablet 50 mg  50 mg Oral BID    divalproex DR (DEPAKOTE) tablet 125 mg  125 mg Oral TID PRN    [START ON 3/13/2018] lisinopril (PRINIVIL, ZESTRIL) tablet 10 mg  10 mg Oral DAILY    LORazepam (ATIVAN) tablet 0.5 mg  0.5 mg Oral BID PRN    memantine (NAMENDA) tablet 5 mg  5 mg Oral BID    pravastatin (PRAVACHOL) tablet 20 mg  20 mg Oral QHS    [START ON 3/13/2018] famotidine (PEPCID) tablet 40 mg  40 mg Oral DAILY    traMADol (ULTRAM) tablet 25 mg  25 mg Oral Q6H PRN    sodium chloride (NS) flush 5-10 mL  5-10 mL IntraVENous Q8H    sodium chloride (NS) flush 5-10 mL  5-10 mL IntraVENous PRN    tuberculin injection 5 Units  5 Units IntraDERMal ONCE    acetaminophen (TYLENOL) tablet 650 mg  650 mg Oral Q4H PRN    ondansetron (ZOFRAN) injection 4 mg  4 mg IntraVENous Q4H PRN    0.9% sodium chloride infusion  75 mL/hr IntraVENous CONTINUOUS    haloperidol lactate (HALDOL) injection 3 mg  3 mg IntraMUSCular Q6H PRN       Social History:  Social History   Substance Use Topics    Smoking status: Never Smoker    Smokeless tobacco: Never Used    Alcohol use No       Pt denies any history of drug use, blood transfusions, or tattoos. Family History:  Family History   Problem Relation Age of Onset    Heart Disease Mother     Lung Disease Father     Cancer Father        Review of Systems:  A detailed 10 system ROS is obtained, with pertinent positives as listed above. All others are negative. Diet:  NPO    Objective:     Physical Exam:  Vitals:  Visit Vitals    /84    Pulse 90    Temp 97.4 °F (36.3 °C)    Resp 18    SpO2 93%     Gen:  Pt is alert, cooperative, no acute distress, sitting in chair and appears comfortable  Skin:  Extremities and face reveal no rashes. HEENT: Sclerae anicteric. Extra-occular muscles are intact. No oral ulcers. No abnormal pigmentation of the lips. The neck is supple. Cardiovascular: Regular rate and rhythm. No gallops, or rubs. Harsh systolic murmur present  Respiratory:  Comfortable breathing with no accessory muscle use. Clear breath sounds anteriorly with no wheezes, rales, or rhonchi. GI:  Abdomen distended and tympanic with mild TTP without rebound. Hypo-active bowel sounds with occasional high pitched sounds. No enlargement of the liver or spleen. No masses palpable. Rectal:  Deferred  Musculoskeletal:  No pitting edema of the lower legs. Neurological:  Dementia, not oriented    Laboratory:    Reviewed. CBC and BMP unremarkable with Hgb increased from 11.5 to 14.6 likely indicating hemoconcentration.     2/16/18 EXAM: CT abdomen and pelvis with iv contrast  FINDINGS:  There is scarring, atelectasis present at the lung bases bilaterally. There is a  large Bochdalek hernia present. CT abdomen: The liver and spleen enhances homogeneously without discrete  lesions. There is no biliary ductal dilatation. The gallbladder, pancreas and  adrenal glands are normal. The kidneys enhance symmetrically. Bowel loops in the  upper abdomen are normal. No definite upper abdominal lymphadenopathy seen. CT pelvis: There is significant distention involving multiple loops of colon. There is a fluid-filled rectum. There is a question of sigmoid fall [place]'s. The  bladder and rectum are normal. No pelvic adenopathy seen. No free air or free  fluid seen within the pelvis. Bone window evaluation demonstrates no aggressive osseous lesions. IMPRESSION:  1. Multiple distended loops of colon. There is question of fall he was (image  64). There is a fluid-filled rectum. 2. Large Bochdalek hernia. 3. Scarring, atelectasis present at the lung bases bilaterally. LIMITED UN-PREPPED COLONOSCOPY ON 18  Cayden Gonzalez  MR#: [id number]  : 1935  ACCOUNT #: [de-identified]   DATE OF SERVICE: 2018  SUBJECTIVE:  An 41-year-old female is undergoing an urgent sigmoidoscopy after being seen in the ER because of abdominal distention. Questionable sigmoid volvulus noted on CT scan as well as a large Bochdalek hernia present. There are multiple distended loops of colon present. Sigmoidoscopy done today to decompress the colon. The patient has not been prepped. Risks (bleeding, perforation, infection, untoward cardiovascular or respiratory event, mortality), benefits and alternatives, discussed in detail with the patient who agrees to proceed. An irregular rhythm was noted prior to the endoscopy. ANESTHESIA:  Per MAC anesthesia. INSTRUMENT:  PCF-190 video colonoscope. PROCEDURE:  Digital rectal examination was performed which revealed a large amount of liquid stool.   The pediatric colonoscope was inserted in the rectal vault. The colonoscope traversed and reached what appeared to be approximately 50 cm. At this point in time, because of formed debris, and a spastic colon, we simply could not proceed any further. The endoscope was removed from the area. I encountered a twist in the distal sigmoid colon. We traversed this twist without difficulty. There was a large amount of air that was suctioned as well as stool that was suctioned, air and stool suctioned to the rectosigmoid area. Following this, the abdomen was much less distended. The endoscope backed into the rectum where retroflexed view of the anal verge was not performed secondary to retained debris. The anoscope was inserted. There was large debris that was then evacuated from the rectal vault. I could not identify any anorectal abnormalities, but again the debris prevented a thorough examination of this area. The anoscope was removed from the patient. The patient tolerated the procedure well and returned to the recovery area in stable condition. IMPRESSION:  1. Sigmoid volvulus decompressed with scope with much less abdominal distention noted following procedure. 2.  Retained debris. 3.  Study to 50 cm only because of retained debris and spasm. 4.  Irregular rhythm prior to the procedure. RECOMMENDATIONS:      1. Repeat films (followup films). 2.  Recommend Gastrografin enema and surgical consultation if necessary pending results of films. 3.  Surgical consultation regarding the patient's hernia. Cinthia Goodrich MD      Assessment:     Principal Problem:    Volvulus ([address] (3/12/2018)    Active Problems:    Weakness generalized (7/24/2012)      Dementia with behavioral disturbance (3/12/2018)      Gaseous abdominal distention (3/12/2018)    80 y.o. female with PMH of CVA, dementia, HTN,HLD, GERD who is seen in consultation at the request of Dr. Libby Burris for abdominal distention with questionable volvulus. Pt had sigmoid volvulus in Feb 2018 that resolved with flex sig/decompression on 2/1/6/18. She was seen in office follow up today and distended abdomen with high pitched bowel sounds were noted. She was sent for Abdominal films showing distal colonic distention (full report above) similar to findings on CT 2/16/18. Per Dr. Ella Hollins (surgery), colonic decompression with insertion of rectal tube should precede bowel prep/ surgery. Pt appears comfortable and hemodynamically stable. Plan:     - NPO now except sips with meds  - Flex sig with decompression and insertion of rectal tube in AM with Dr. Haylee Grewal. Family at bedside is agreeable to sign consent  - Surgery recommendations to follow. Patient is seen and examined in collaboration with Dr. Sweta Jorge. Assessment and plan as per Dr. Sweta Jorge. Raiza Hopper    Agree with above. Patient was seen by Dr. Garland Frias in our Office earlier. He has discussed with the Radiologist at [facility] and plans are to proceed with Flex Sigmoidoscopy tomorrow. Air-filled colon loop extending into the large hiatal hernia sac that projects over the cardiac silhouette increases the risk of the procedure but it was tolerated 2/16/18 as described above and options for management are limited. Prior CT had also revealed a large Bochdalek hernia. Will review possible placement of rectal tube or colonic decompression tube with Dr. Haylee Grewal. Consider NG tube if symptomatic.     Meg Anderson MD

## 2018-03-13 NOTE — ANESTHESIA POSTPROCEDURE EVALUATION
Post-Anesthesia Evaluation and Assessment    Patient: Roark Peabody MRN: [id number]  SSN: [id number]    YOB: 1935  Age: 80 y.o. Sex: female       Cardiovascular Function/Vital Signs  Visit Vitals    /68    Pulse 93    Temp 37 °C (98.6 °F)    Resp 14    SpO2 97%    Breastfeeding No       Patient is status post total IV anesthesia anesthesia for Procedure(s):  SIGMOIDOSCOPY FLEXIBLE  COLON DECOMPRESSION. Nausea/Vomiting: None    Postoperative hydration reviewed and adequate. Pain:  Pain Scale 1: Numeric (0 - 10) (03/13/18 1238)  Pain Intensity 1: 0 (03/13/18 1238)   Managed    Neurological Status:   Neuro  Neurologic State: Alert;Confused (03/13/18 8617)  Orientation Level: Disoriented to place; Disoriented to situation;Disoriented to time;Oriented to person (03/13/18 1174)  Cognition: Decreased attention/concentration; Impaired decision making (03/13/18 0742)  Speech: Clear (03/13/18 0742)  LUE Motor Response: Purposeful (03/13/18 0742)  LLE Motor Response: Purposeful (03/13/18 0742)  RUE Motor Response: Purposeful (03/13/18 0742)  RLE Motor Response: Purposeful (03/13/18 0742)   At baseline    Mental Status and Level of Consciousness: Arousable    Pulmonary Status:   O2 Device: Room air (03/13/18 1238)   Adequate oxygenation and airway patent    Complications related to anesthesia: None    Post-anesthesia assessment completed.  No concerns    Signed By: Hillary Marques MD     March 13, 2018

## 2018-03-13 NOTE — PROGRESS NOTES
Dual skin assessment completed with Margarito Townsend RN. BLE redness from shin down. Left shin with bruising and swollen protrusion, daughters states she fell last Thursday 3/8/18 at [facility]. No other breakdown noted. Pt is confused and oriented to self only. Loud and yelling at daughter.  States she doesn't know who her daughters ar

## 2018-03-13 NOTE — PROGRESS NOTES
Problem: Falls - Risk of  Goal: *Absence of Falls  Document Bartolo Fall Risk and appropriate interventions in the flowsheet.    Outcome: Progressing Towards Goal  Fall Risk Interventions:  Mobility Interventions: Bed/chair exit alarm, Communicate number of staff needed for ambulation/transfer, OT consult for ADLs, Patient to call before getting OOB, PT Consult for mobility concerns    Mentation Interventions: Bed/chair exit alarm, Door open when patient unattended, Evaluate medications/consider consulting pharmacy    Medication Interventions: Evaluate medications/consider consulting pharmacy, Patient to call before getting OOB, Teach patient to arise slowly    Elimination Interventions: Call light in reach, Patient to call for help with toileting needs, Toileting schedule/hourly rounds

## 2018-03-13 NOTE — PROGRESS NOTES
TRANSFER - IN REPORT:    Verbal report received from Carla Osman on Alejandrina Loser  being received from GI lab for routine progression of care      Report consisted of patients Situation, Background, Assessment and   Recommendations(SBAR). Information from the following report(s) SBAR, Kardex, Procedure Summary, Intake/Output, MAR and Recent Results was reviewed with the receiving nurse. Opportunity for questions and clarification was provided. Assessment completed upon patients arrival to unit and care assumed.

## 2018-03-13 NOTE — PROGRESS NOTES
TRANSFER - OUT REPORT:    Verbal report given to Jose Enrique Howard RN(name) on Brandon Canales  being transferred to [facility](unit) for routine post - op       Report consisted of patients Situation, Background, Assessment and   Recommendations(SBAR). Information from the following report(s) SBAR was reviewed with the receiving nurse. Lines:   Peripheral IV 03/12/18 Right Antecubital (Active)   Site Assessment Clean, dry, & intact 3/13/2018  7:42 AM   Phlebitis Assessment 0 3/13/2018  7:42 AM   Infiltration Assessment 0 3/13/2018  7:42 AM   Dressing Status Clean, dry, & intact 3/13/2018  7:42 AM   Dressing Type Tape;Transparent 3/13/2018  7:42 AM   Hub Color/Line Status Infusing 3/13/2018  7:42 AM        Opportunity for questions and clarification was provided.       Patient transported with:

## 2018-03-13 NOTE — ANESTHESIA PREPROCEDURE EVALUATION
Anesthetic History               Review of Systems / Medical History  Patient summary reviewed    Pulmonary                   Neuro/Psych         Neuromuscular disease (Polio, uses walker), TIA and dementia     Cardiovascular    Hypertension: well controlled              Exercise tolerance: <4 METS: Due to fragility     GI/Hepatic/Renal     GERD           Endo/Other  Within defined limits           Other Findings              Physical Exam    Airway  Mallampati: III  TM Distance: 4 - 6 cm  Neck ROM: decreased range of motion   Mouth opening: Diminished (comment)     Cardiovascular  Regular rate and rhythm,  S1 and S2 normal,  no murmur, click, rub, or gallop             Dental    Dentition: Upper partial plate     Pulmonary  Breath sounds clear to auscultation               Abdominal         Other Findings            Anesthetic Plan    ASA: 3, emergent  Anesthesia type: total IV anesthesia          Induction: Intravenous  Anesthetic plan and risks discussed with: Patient

## 2018-03-13 NOTE — PROGRESS NOTES
Surgery  Asked by Dr. Brandee Cooley, of the hospitalist service, to see this patient with recurrent sigmoid volvulus. She was in the hospital in February with a volvulus that was decompressed and resolved without further therapy. She now has recurrent distention. Plan: 1. Would recommend GI decompression  2. Leave rectal tube so that bowel prep can be done  3. Elective sigmoid colon resection after decompression and bowel prep.   Rose Oshea MD.

## 2018-03-13 NOTE — ROUTINE PROCESS
Patient received to endoscopy via transport . Denies SOB and pain. Oriented to room and call light. Instructed not to get OOB without assist, report pain/SOB ASAP and report any needs - verbalizes understanding. Consent for procedure obtained. Sitting up in bed in no apparent distress. Family at bedside.

## 2018-03-13 NOTE — PROGRESS NOTES
CM Specialist Sally Talamantes met with patient and discussed admission status. Medicare Outpatient Observation Notice provided to the patient. Oral explanation was provided and all questions answered. Signed document placed in the medical chart. Copy provided to patient.

## 2018-03-13 NOTE — INTERVAL H&P NOTE
H&P Update:  Fariba Arita was seen and examined. History and physical has been reviewed. The patient has been examined. There have been no significant clinical changes since the completion of the originally dated History and Physical.    Patient states abd pain and distension much improved from yesterday. ABd still distended therefore will proceed with procedure.     Signed By: Kinjal Hayes MD     March 13, 2018 12:00 PM

## 2018-03-13 NOTE — PROGRESS NOTES
Surgery  I talked to one of the patient's daughters this morning. Offered the surgical options of sigmoid colon resection with anastamosis or sigmoid colon resection with an end colostomy. The patient's daughter does not want anything done surgically at this point. She says her mother had a \"bad reaction\" to surgery in 2012 with worsening of her dementia. She will discuss it with her sister, but at this point they do not wish for surgical intervention.   Luiz Burgos MD.

## 2018-03-13 NOTE — PROGRESS NOTES
TRANSFER - IN REPORT:    [facility]maan  being received from room 214 (unit) for ordered procedure. Report consisted of patients Situation, Background, Assessment and   Recommendations(SBAR). Information from the following report(s) SBAR was reviewed with the receiving nurse. Opportunity for questions and clarification was provided. Awaiting transport to bring pt to the GI Lab at this time.

## 2018-03-13 NOTE — PROGRESS NOTES
Progress Note    Patient: Phillip Villanueva MRN: [id number]  SSN: [id number]    YOB: 1935  Age: 80 y.o. Sex: female      Admit Date: 3/12/2018    LOS: 0 days     Subjective:     Patient underwent flexible sigmoidoscopy today and with finding as below; \"Immediately upon entry into the rectum a dilated colon noted and suctioning cause large amount of expulsion of air and liquid stool. The colonoscope traversed and reached what appeared to be the descending/distal transverse colon approximately 60 cm from entry. There was no clear twist in the distal sigmoid colon but dilated colon was suctioned successfully. No clear transition point noted. There was a large amount of air that was suctioned as well as stool that was suctioned, air and stool suctioned to the rectosigmoid area.  Following this, the abdomen was much less distended.       Colonic mucosa looked healthy throughout. \"    Patient reported feeling much better after the procedure. She is getting liquid diet now. Objective:     Vitals:    03/13/18 1225 03/13/18 1228 03/13/18 1238 03/13/18 1300   BP: 144/89 144/89 154/68 150/79   Pulse: 68  93 70   Resp: 16  14 15   Temp: 98.6 °F (37 °C)   98.5 °F (36.9 °C)   SpO2: 97% 95% 97% 97%        Intake and Output:  Current Shift:    Last three shifts:      Physical Exam:   General:                    Alert, cooperative, no distress, elderly, oriented to self, place and time. Head:                                   Normocephalic, without obvious abnormality, atraumatic. Nose:                                   Nares normal. No drainage or sinus tenderness. Lungs:                       Clear to auscultation bilaterally. No Wheezing or Rhonchi. No rales. Heart:                                  Regular rate and rhythm,  no murmur, rub or gallop. Abdomen:                 less distended, tympanic, soft on palpation, active bowel sound  Extremities:               No cyanosis. No edema.  No clubbing  Skin:                                    Texture, turgor normal. No rashes or lesions. Not Jaundiced  Neurologic:                Alert and oriented to self, no focal deficits     Lab/Data Review:  Recent Results (from the past 24 hour(s))   METABOLIC PANEL, BASIC    Collection Time: 03/12/18  9:17 PM   Result Value Ref Range    Sodium 139 136 - 145 mmol/L    Potassium 3.8 3.5 - 5.1 mmol/L    Chloride 101 98 - 107 mmol/L    CO2 29 21 - 32 mmol/L    Anion gap 9 7 - 16 mmol/L    Glucose 121 (H) 65 - 100 mg/dL    BUN 11 8 - 23 MG/DL    Creatinine 0.79 0.6 - 1.0 MG/DL    GFR est AA >60 >60 ml/min/1.73m2    GFR est non-AA >60 >60 ml/min/1.73m2    Calcium 9.5 8.3 - 10.4 MG/DL   CBC WITH AUTOMATED DIFF    Collection Time: 03/12/18  9:17 PM   Result Value Ref Range    WBC 8.9 4.3 - 11.1 K/uL    RBC 4.57 4.05 - 5.25 M/uL    HGB 14.6 11.7 - 15.4 g/dL    HCT 43.0 35.8 - 46.3 %    MCV 94.1 79.6 - 97.8 FL    MCH 31.9 26.1 - 32.9 PG    MCHC 34.0 31.4 - 35.0 g/dL    RDW 14.7 (H) 11.9 - 14.6 %    PLATELET 231 895 - 088 K/uL    MPV 10.9 10.8 - 14.1 FL    DF AUTOMATED      NEUTROPHILS 66 43 - 78 %    LYMPHOCYTES 19 13 - 44 %    MONOCYTES 12 4.0 - 12.0 %    EOSINOPHILS 2 0.5 - 7.8 %    BASOPHILS 1 0.0 - 2.0 %    IMMATURE GRANULOCYTES 0 0.0 - 5.0 %    ABS. NEUTROPHILS 5.9 1.7 - 8.2 K/UL    ABS. LYMPHOCYTES 1.7 0.5 - 4.6 K/UL    ABS. MONOCYTES 1.1 0.1 - 1.3 K/UL    ABS. EOSINOPHILS 0.2 0.0 - 0.8 K/UL    ABS. BASOPHILS 0.1 0.0 - 0.2 K/UL    ABS. IMM.  GRANS. 0.0 0.0 - 0.5 K/UL   METABOLIC PANEL, BASIC    Collection Time: 03/13/18  5:36 AM   Result Value Ref Range    Sodium 142 136 - 145 mmol/L    Potassium 2.8 (LL) 3.5 - 5.1 mmol/L    Chloride 105 98 - 107 mmol/L    CO2 25 21 - 32 mmol/L    Anion gap 12 7 - 16 mmol/L    Glucose 104 (H) 65 - 100 mg/dL    BUN 10 8 - 23 MG/DL    Creatinine 0.62 0.6 - 1.0 MG/DL    GFR est AA >60 >60 ml/min/1.73m2    GFR est non-AA >60 >60 ml/min/1.73m2    Calcium 8.6 8.3 - 10.4 MG/DL   CBC WITH AUTOMATED DIFF    Collection Time: 03/13/18  5:36 AM   Result Value Ref Range    WBC 6.6 4.3 - 11.1 K/uL    RBC 4.12 4.05 - 5.25 M/uL    HGB 13.0 11.7 - 15.4 g/dL    HCT 38.5 35.8 - 46.3 %    MCV 93.4 79.6 - 97.8 FL    MCH 31.6 26.1 - 32.9 PG    MCHC 33.8 31.4 - 35.0 g/dL    RDW 14.6 11.9 - 14.6 %    PLATELET 922 (L) 323 - 450 K/uL    MPV 10.6 (L) 10.8 - 14.1 FL    DF AUTOMATED      NEUTROPHILS 60 43 - 78 %    LYMPHOCYTES 23 13 - 44 %    MONOCYTES 13 (H) 4.0 - 12.0 %    EOSINOPHILS 3 0.5 - 7.8 %    BASOPHILS 1 0.0 - 2.0 %    IMMATURE GRANULOCYTES 0 0.0 - 5.0 %    ABS. NEUTROPHILS 4.0 1.7 - 8.2 K/UL    ABS. LYMPHOCYTES 1.5 0.5 - 4.6 K/UL    ABS. MONOCYTES 0.8 0.1 - 1.3 K/UL    ABS. EOSINOPHILS 0.2 0.0 - 0.8 K/UL    ABS. BASOPHILS 0.1 0.0 - 0.2 K/UL    ABS. IMM. GRANS. 0.0 0.0 - 0.5 K/UL   POTASSIUM    Collection Time: 03/13/18  2:11 PM   Result Value Ref Range    Potassium 3.4 (L) 3.5 - 5.1 mmol/L             Assessment:     Principal Problem:    Volvulus ([address] (3/12/2018)    Active Problems:    Weakness generalized (7/24/2012)      Dementia with behavioral disturbance (3/12/2018)      Gaseous abdominal distention (3/12/2018)      Hypokalemia    Plan:     Sigmoid volvulus which is improved with sigmoidoscopy. Will continue to monitor on liquid diet. Hypokalemia : will continue to replete    Dementia : seems at baseline : monitor. Generalized weakness : improved with resolution of volvulus. I have discussed the plan of care with patient an family member at bedside.      Signed By: Maisha Ring MD     March 13, 2018

## 2018-03-13 NOTE — CONSULTS
[facility] Consult Note       Consulted GI Physician: Dr. Juancarlos Allen    Referring Physician:  Dr. Shaniqua Crow Date:  3/12/2018    Admit Date:  3/12/2018    Chief Complaint:  Abdominal distention ?volvulus    Subjective:     History of Present Illness:  Patient is a 80 y.o. female with PMH of CVA, dementia, HTN, HLD, GERD who is seen in consultation at the request of Dr. Tobias Lema for abdominal distention with questionable volvulus. Pt had sigmoid volvulus in Feb 2018 that resolved with flex sig/decompression on 2/1/6/18. She was seen in office follow up today and distended abdomen with high pitched bowel sounds were noted. She was sent for Abdominal films showing distal colonic distention (full report below) similar to findings on CT 2/16/18. She was referred to Hospitalists for admission and surgical consultation. Per Dr. Herlinda Shaw, colonic decompression with insertion of rectal tube should precede bowel prep/ surgery. Pt is an unreliable historian but denies abdominal pain. There has been no N/V, changes in bowel habits, hematochezia, or melena reported. Abdominal series 03/12/2018. Marked gas distention of the numerous loops of mid to distal colon.  The cecum and ascending colon appear to be normal diameter.  There is air distending the distal sigmoid colon and rectum. Review of CT scan performed elsewhere 02/16/2018 documents a very similar bowel gas pattern.  There is a partial twist of the sigmoid colon on the CT scan with distention of the colon proximal to that point but some distention of the distal sigmoid and rectum as well, in a pattern very similar today's exam. Persistent suspicion of partial volvulus versus adhesion producing partial distal colonic obstruction at that site. No evidence of free air.  1 of the air-filled colon loops extends into the large hiatal hernia that projects over the cardiac silhouette.  That was present on 02/16/2018 as well.  Low lung volumes with atelectasis in the lung bases. PMH:  Past Medical History:   Diagnosis Date    GERD (gastroesophageal reflux disease)     Other ill-defined conditions(799.89)     has frequent UTI's, polio    Polio     since age 3    Stroke ([facility] [address] 2011    no side effects - TIA       PSH:  Past Surgical History:   Procedure Laterality Date    ABDOMEN SURGERY PROC UNLISTED      hernia    FLEXIBLE SIGMOIDOSCOPY N/A 2/16/2018    SIGMOIDOSCOPY FLEXIBLE performed by Jay Meredith MD at [facility] ENDOSCOPY    HX ADENOIDECTOMY      HX GI      hemorrhoidectomy    HX HYSTERECTOMY      HX ORTHOPAEDIC      right foot and leg x2    HX TONSILLECTOMY         Allergies: Allergies   Allergen Reactions    Amoxicillin Rash    Codeine Rash    Keflex [Cephalexin] Rash    Penicillins Unknown (comments)    Sulfa (Sulfonamide Antibiotics) Unknown (comments)       Home Medications:  Prior to Admission medications    Medication Sig Start Date End Date Taking? Authorizing Provider   divalproex DR (DEPAKOTE) 125 mg tablet Take 125 mg by mouth three (3) times daily as needed for Other (agitation). Yes Historical Provider   LORazepam (ATIVAN) 0.5 mg tablet Take 0.5 mg by mouth two (2) times daily as needed for Anxiety. Yes Historical Provider   guaiFENesin (MUCINEX) 1,200 mg Ta12 ER tablet Take 1,200 mg by mouth two (2) times a day. Yes Historical Provider   ondansetron hcl (ZOFRAN, AS HYDROCHLORIDE,) 4 mg tablet Take 4 mg by mouth every eight (8) hours as needed for Nausea. Yes Historical Provider   traMADol (ULTRAM) 50 mg tablet Take 25 mg by mouth every six (6) hours as needed for Pain. Yes Historical Provider   citalopram (CELEXA) 20 mg tablet Take 20 mg by mouth every evening. Yes Historical Provider   divalproex DR (DEPAKOTE) 125 mg tablet Take 125 mg by mouth daily. Yes Historical Provider   furosemide (LASIX) 20 mg tablet Take 30 mg by mouth daily.    Yes Historical Provider   memantine (NAMENDA) 5 mg tablet Take 5 mg by mouth two (2) times a day. Yes Historical Provider   aspirin delayed-release 81 mg tablet Take  by mouth daily. Yes Nehemiah Fitzgerald MD   docusate sodium (COLACE) 100 mg capsule Take 100 mg by mouth two (2) times daily as needed. Yes Nehemiah Fitzgerald MD   lidocaine 5 % topical cream Apply  to affected area as directed. Yes Nehemiah Fitzgerald MD   bisacodyl (DULCOLAX) 10 mg suppository Insert 10 mg into rectum daily as needed. Yes Nehemiah Fitzgerald MD   pravastatin (PRAVACHOL) 10 mg tablet Take 20 mg by mouth nightly. Yes Historical Provider   lisinopril (PRINIVIL, ZESTRIL) 20 mg tablet Take 10 mg by mouth daily. Yes Historical Provider   ranitidine (ZANTAC) 150 mg tablet Take 150 mg by mouth two (2) times a day. Yes Historical Provider   dipyridamole (PERSANTINE) 50 mg tablet Take 50 mg by mouth two (2) times a day. Indications: Transient Cerebral Ischemia   Yes Historical Provider   Cetirizine (ZYRTEC) 10 mg Cap Take 10 mg by mouth nightly.      Yes Historical Provider       Hospital Medications:  Current Facility-Administered Medications   Medication Dose Route Frequency    bisacodyl (DULCOLAX) suppository 10 mg  10 mg Rectal DAILY PRN    loratadine (CLARITIN) tablet 10 mg  10 mg Oral QHS    citalopram (CELEXA) tablet 20 mg  20 mg Oral QPM    dipyridamole (PERSANTINE) tablet 50 mg  50 mg Oral BID    divalproex DR (DEPAKOTE) tablet 125 mg  125 mg Oral TID PRN    [START ON 3/13/2018] lisinopril (PRINIVIL, ZESTRIL) tablet 10 mg  10 mg Oral DAILY    LORazepam (ATIVAN) tablet 0.5 mg  0.5 mg Oral BID PRN    memantine (NAMENDA) tablet 5 mg  5 mg Oral BID    pravastatin (PRAVACHOL) tablet 20 mg  20 mg Oral QHS    [START ON 3/13/2018] famotidine (PEPCID) tablet 40 mg  40 mg Oral DAILY    traMADol (ULTRAM) tablet 25 mg  25 mg Oral Q6H PRN    sodium chloride (NS) flush 5-10 mL  5-10 mL IntraVENous Q8H    sodium chloride (NS) flush 5-10 mL  5-10 mL IntraVENous PRN    tuberculin injection 5 Units  5 Units IntraDERMal ONCE    acetaminophen (TYLENOL) tablet 650 mg  650 mg Oral Q4H PRN    ondansetron (ZOFRAN) injection 4 mg  4 mg IntraVENous Q4H PRN    0.9% sodium chloride infusion  75 mL/hr IntraVENous CONTINUOUS    haloperidol lactate (HALDOL) injection 3 mg  3 mg IntraMUSCular Q6H PRN       Social History:  Social History   Substance Use Topics    Smoking status: Never Smoker    Smokeless tobacco: Never Used    Alcohol use No       Pt denies any history of drug use, blood transfusions, or tattoos. Family History:  Family History   Problem Relation Age of Onset    Heart Disease Mother     Lung Disease Father     Cancer Father        Review of Systems:  A detailed 10 system ROS is obtained, with pertinent positives as listed above. All others are negative. Diet:  NPO    Objective:     Physical Exam:  Vitals:  Visit Vitals    /84    Pulse 90    Temp 97.4 °F (36.3 °C)    Resp 18    SpO2 93%     Gen:  Pt is alert, cooperative, no acute distress, sitting in chair and appears comfortable  Skin:  Extremities and face reveal no rashes. HEENT: Sclerae anicteric. Extra-occular muscles are intact. No oral ulcers. No abnormal pigmentation of the lips. The neck is supple. Cardiovascular: Regular rate and rhythm. No gallops, or rubs. Harsh systolic murmur present  Respiratory:  Comfortable breathing with no accessory muscle use. Clear breath sounds anteriorly with no wheezes, rales, or rhonchi. GI:  Abdomen distended and tympanic with mild TTP without rebound. Hypo-active bowel sounds with occasional high pitched sounds. No enlargement of the liver or spleen. No masses palpable. Rectal:  Deferred  Musculoskeletal:  No pitting edema of the lower legs. Neurological:  Dementia, not oriented    Laboratory:    Reviewed. CBC and BMP unremarkable with Hgb increased from 11.5 to 14.6 likely indicating hemoconcentration.     2/16/18 EXAM: CT abdomen and pelvis with iv contrast  FINDINGS:  There is scarring, atelectasis present at the lung bases bilaterally. There is a  large Bochdalek hernia present. CT abdomen: The liver and spleen enhances homogeneously without discrete  lesions. There is no biliary ductal dilatation. The gallbladder, pancreas and  adrenal glands are normal. The kidneys enhance symmetrically. Bowel loops in the  upper abdomen are normal. No definite upper abdominal lymphadenopathy seen. CT pelvis: There is significant distention involving multiple loops of colon. There is a fluid-filled rectum. There is a question of sigmoid fall Savannah's. The  bladder and rectum are normal. No pelvic adenopathy seen. No free air or free  fluid seen within the pelvis. Bone window evaluation demonstrates no aggressive osseous lesions. IMPRESSION:  1. Multiple distended loops of colon. There is question of fall he was (image  64). There is a fluid-filled rectum. 2. Large Bochdalek hernia. 3. Scarring, atelectasis present at the lung bases bilaterally. LIMITED UN-PREPPED COLONOSCOPY ON 18  Maile Conrad  MR#: [id number]  : 1935  ACCOUNT #: [de-identified]   DATE OF SERVICE: 2018  SUBJECTIVE:  An 70-year-old female is undergoing an urgent sigmoidoscopy after being seen in the ER because of abdominal distention. Questionable sigmoid volvulus noted on CT scan as well as a large Bochdalek hernia present. There are multiple distended loops of colon present. Sigmoidoscopy done today to decompress the colon. The patient has not been prepped. Risks (bleeding, perforation, infection, untoward cardiovascular or respiratory event, mortality), benefits and alternatives, discussed in detail with the patient who agrees to proceed. An irregular rhythm was noted prior to the endoscopy. ANESTHESIA:  Per MAC anesthesia. INSTRUMENT:  PCF-190 video colonoscope. PROCEDURE:  Digital rectal examination was performed which revealed a large amount of liquid stool.   The pediatric colonoscope was inserted in the rectal vault. The colonoscope traversed and reached what appeared to be approximately 50 cm. At this point in time, because of formed debris, and a spastic colon, we simply could not proceed any further. The endoscope was removed from the area. I encountered a twist in the distal sigmoid colon. We traversed this twist without difficulty. There was a large amount of air that was suctioned as well as stool that was suctioned, air and stool suctioned to the rectosigmoid area. Following this, the abdomen was much less distended. The endoscope backed into the rectum where retroflexed view of the anal verge was not performed secondary to retained debris. The anoscope was inserted. There was large debris that was then evacuated from the rectal vault. I could not identify any anorectal abnormalities, but again the debris prevented a thorough examination of this area. The anoscope was removed from the patient. The patient tolerated the procedure well and returned to the recovery area in stable condition. IMPRESSION:  1. Sigmoid volvulus decompressed with scope with much less abdominal distention noted following procedure. 2.  Retained debris. 3.  Study to 50 cm only because of retained debris and spasm. 4.  Irregular rhythm prior to the procedure. RECOMMENDATIONS:      1. Repeat films (followup films). 2.  Recommend Gastrografin enema and surgical consultation if necessary pending results of films. 3.  Surgical consultation regarding the patient's hernia. Natasha Jacobs MD      Assessment:     Principal Problem:    Volvulus ([address] (3/12/2018)    Active Problems:    Weakness generalized (7/24/2012)      Dementia with behavioral disturbance (3/12/2018)      Gaseous abdominal distention (3/12/2018)    80 y.o. female with PMH of CVA, dementia, HTN,HLD, GERD who is seen in consultation at the request of Dr. Selene Raymond for abdominal distention with questionable volvulus. Pt had sigmoid volvulus in Feb 2018 that resolved with flex sig/decompression on 2/1/6/18. She was seen in office follow up today and distended abdomen with high pitched bowel sounds were noted. She was sent for Abdominal films showing distal colonic distention (full report above) similar to findings on CT 2/16/18. Per Dr. Mahesh Fish (surgery), colonic decompression with insertion of rectal tube should precede bowel prep/ surgery. Pt appears comfortable and hemodynamically stable. Plan:     - NPO now except sips with meds  - Flex sig with decompression and insertion of rectal tube in AM with Dr. Nain Duncan. Family at bedside is agreeable to sign consent  - Surgery recommendations to follow. Patient is seen and examined in collaboration with Dr. Rose Hightower. Assessment and plan as per Dr. Rose Hightower. Raiza Rome    Agree with above. Patient was seen by Dr. Sanjay Pate in our Office earlier. He has discussed with the Radiologist at [facility] and plans are to proceed with Flex Sigmoidoscopy tomorrow. Air-filled colon loop extending into the large hiatal hernia sac that projects over the cardiac silhouette increases the risk of the procedure but it was tolerated 2/16/18 as described above and options for management are limited. Prior CT had also revealed a large Bochdalek hernia. Will review possible placement of rectal tube or colonic decompression tube with Dr. Nain Duncan. Consider NG tube if symptomatic.     Oj Martinez MD

## 2018-03-13 NOTE — PROGRESS NOTES
TRANSFER - OUT REPORT:    Verbal report given to GI lab on [place]  being transferred for ordered procedure       Report consisted of patients Situation, Background, Assessment and   Recommendations(SBAR). Information from the following report(s) SBAR, Kardex, Procedure Summary, Intake/Output, MAR and Recent Results was reviewed with the receiving nurse. Lines:   Peripheral IV 03/12/18 Right Antecubital (Active)   Site Assessment Clean, dry, & intact 3/13/2018  7:42 AM   Phlebitis Assessment 0 3/13/2018  7:42 AM   Infiltration Assessment 0 3/13/2018  7:42 AM   Dressing Status Clean, dry, & intact 3/13/2018  7:42 AM   Dressing Type Tape;Transparent 3/13/2018  7:42 AM   Hub Color/Line Status Infusing 3/13/2018  7:42 AM        Opportunity for questions and clarification was provided.

## 2018-03-14 VITALS
HEART RATE: 61 BPM | SYSTOLIC BLOOD PRESSURE: 133 MMHG | RESPIRATION RATE: 18 BRPM | TEMPERATURE: 97.4 F | OXYGEN SATURATION: 95 % | DIASTOLIC BLOOD PRESSURE: 78 MMHG

## 2018-03-14 LAB
ANION GAP SERPL CALC-SCNC: 10 MMOL/L (ref 7–16)
BASOPHILS # BLD: 0 K/UL (ref 0–0.2)
BASOPHILS NFR BLD: 1 % (ref 0–2)
BUN SERPL-MCNC: 6 MG/DL (ref 8–23)
CALCIUM SERPL-MCNC: 7.9 MG/DL (ref 8.3–10.4)
CHLORIDE SERPL-SCNC: 111 MMOL/L (ref 98–107)
CO2 SERPL-SCNC: 22 MMOL/L (ref 21–32)
CREAT SERPL-MCNC: 0.46 MG/DL (ref 0.6–1)
DIFFERENTIAL METHOD BLD: ABNORMAL
EOSINOPHIL # BLD: 0.2 K/UL (ref 0–0.8)
EOSINOPHIL NFR BLD: 3 % (ref 0.5–7.8)
ERYTHROCYTE [DISTWIDTH] IN BLOOD BY AUTOMATED COUNT: 14.5 % (ref 11.9–14.6)
GLUCOSE SERPL-MCNC: 78 MG/DL (ref 65–100)
HCT VFR BLD AUTO: 34.3 % (ref 35.8–46.3)
HGB BLD-MCNC: 11.4 G/DL (ref 11.7–15.4)
IMM GRANULOCYTES # BLD: 0 K/UL (ref 0–0.5)
IMM GRANULOCYTES NFR BLD AUTO: 0 % (ref 0–5)
LYMPHOCYTES # BLD: 1.4 K/UL (ref 0.5–4.6)
LYMPHOCYTES NFR BLD: 26 % (ref 13–44)
MCH RBC QN AUTO: 31.1 PG (ref 26.1–32.9)
MCHC RBC AUTO-ENTMCNC: 33.2 G/DL (ref 31.4–35)
MCV RBC AUTO: 93.7 FL (ref 79.6–97.8)
MONOCYTES # BLD: 0.6 K/UL (ref 0.1–1.3)
MONOCYTES NFR BLD: 11 % (ref 4–12)
NEUTS SEG # BLD: 3.2 K/UL (ref 1.7–8.2)
NEUTS SEG NFR BLD: 59 % (ref 43–78)
PLATELET # BLD AUTO: 138 K/UL (ref 150–450)
PMV BLD AUTO: 10.5 FL (ref 10.8–14.1)
POTASSIUM SERPL-SCNC: 2.9 MMOL/L (ref 3.5–5.1)
POTASSIUM SERPL-SCNC: 3.4 MMOL/L (ref 3.5–5.1)
RBC # BLD AUTO: 3.66 M/UL (ref 4.05–5.25)
SODIUM SERPL-SCNC: 143 MMOL/L (ref 136–145)
WBC # BLD AUTO: 5.5 K/UL (ref 4.3–11.1)

## 2018-03-14 PROCEDURE — 74011250636 HC RX REV CODE- 250/636: Performed by: INTERNAL MEDICINE

## 2018-03-14 PROCEDURE — 99218 HC RM OBSERVATION: CPT

## 2018-03-14 PROCEDURE — 65270000029 HC RM PRIVATE

## 2018-03-14 PROCEDURE — 84132 ASSAY OF SERUM POTASSIUM: CPT | Performed by: INTERNAL MEDICINE

## 2018-03-14 PROCEDURE — 85025 COMPLETE CBC W/AUTO DIFF WBC: CPT | Performed by: INTERNAL MEDICINE

## 2018-03-14 PROCEDURE — 74011250637 HC RX REV CODE- 250/637: Performed by: INTERNAL MEDICINE

## 2018-03-14 PROCEDURE — 80048 BASIC METABOLIC PNL TOTAL CA: CPT | Performed by: INTERNAL MEDICINE

## 2018-03-14 PROCEDURE — 36415 COLL VENOUS BLD VENIPUNCTURE: CPT | Performed by: INTERNAL MEDICINE

## 2018-03-14 RX ORDER — POTASSIUM CHLORIDE 14.9 MG/ML
20 INJECTION INTRAVENOUS
Status: DISCONTINUED | OUTPATIENT
Start: 2018-03-14 | End: 2018-03-14 | Stop reason: ALTCHOICE

## 2018-03-14 RX ORDER — POTASSIUM CHLORIDE 20 MEQ/1
40 TABLET, EXTENDED RELEASE ORAL 2 TIMES DAILY
Qty: 4 TAB | Refills: 0 | Status: SHIPPED | OUTPATIENT
Start: 2018-03-14 | End: 2018-03-15

## 2018-03-14 RX ADMIN — MEMANTINE HYDROCHLORIDE 5 MG: 5 TABLET ORAL at 17:31

## 2018-03-14 RX ADMIN — SODIUM CHLORIDE 75 ML/HR: 900 INJECTION, SOLUTION INTRAVENOUS at 02:47

## 2018-03-14 RX ADMIN — Medication 5 ML: at 06:55

## 2018-03-14 RX ADMIN — LISINOPRIL 10 MG: 5 TABLET ORAL at 09:00

## 2018-03-14 RX ADMIN — SODIUM CHLORIDE: 900 INJECTION, SOLUTION INTRAVENOUS at 11:23

## 2018-03-14 RX ADMIN — LORAZEPAM 0.5 MG: 0.5 TABLET ORAL at 00:20

## 2018-03-14 RX ADMIN — CITALOPRAM HYDROBROMIDE 20 MG: 20 TABLET ORAL at 17:31

## 2018-03-14 RX ADMIN — FAMOTIDINE 40 MG: 20 TABLET, FILM COATED ORAL at 09:00

## 2018-03-14 RX ADMIN — MEMANTINE HYDROCHLORIDE 5 MG: 5 TABLET ORAL at 09:00

## 2018-03-14 NOTE — PHYSICIAN ADVISORY
Letter of Determination: Upgrade from Observation to Inpatient Status    This patient was originally hospitalized as Outpatient Status with Observation Services on 3/12/2018 for sigmoid volvulus. This patient now meets for Inpatient Admission in accordance with CMS regulation Section 43 .3. Specifically, patient's stay is now over Two Midnights and was medically necessary. The patient's stay was medically necessary based on end stage dementia, recurrent volvulus, with plan for bowel decompression, and possible sigmoid resection. Consistent with CMS guidelines, patient meets for inpatient status. It is our recommendation that this patient's hospitalization status should be upgraded from OBSERVATION to INPATIENT status.      The final decision regarding the patient's hospitalization status depends on the attending physician's judgement.     Miguel Palomares MD, KAYLA,   Physician [place].

## 2018-03-14 NOTE — PROGRESS NOTES
On call MD called and no return call. Critical K+ called in by Adin Melvin in the lab of 2.9. This seem to be in the normal range for this patient.

## 2018-03-14 NOTE — PROGRESS NOTES
Ueventful shift. Patient very confused and attempted to get OOB. Ativan given after several attempts. No vomiting noted this shift. Bruise/hematoma to LLE healing. Patient finally settled down and went to sleep. Hourly rounds completed throughout shift. Patient denies needs at this time. Will continue to monitor and give bedside report to oncoming day shift nurse.

## 2018-03-14 NOTE — DISCHARGE INSTRUCTIONS
DISCHARGE SUMMARY from Nurse    PATIENT INSTRUCTIONS:    After general anesthesia or intravenous sedation, for 24 hours or while taking prescription Narcotics:  · Limit your activities  · Do not drive and operate hazardous machinery  · Do not make important personal or business decisions  · Do  not drink alcoholic beverages  · If you have not urinated within 8 hours after discharge, please contact your surgeon on call. Report the following to your surgeon:  · Excessive pain, swelling, redness or odor of or around the surgical area  · Temperature over 100.5  · Nausea and vomiting lasting longer than 4 hours or if unable to take medications  · Any signs of decreased circulation or nerve impairment to extremity: change in color, persistent  numbness, tingling, coldness or increase pain  · Any questions    What to do at Home:  Recommended activity: Activity as tolerated, low fiber diet    If you experience any of the following symptoms fever, chills, new or unrelieved pain, or any other worrisome symptoms , please follow up with PCP. *  Please give a list of your current medications to your Primary Care Provider. *  Please update this list whenever your medications are discontinued, doses are      changed, or new medications (including over-the-counter products) are added. *  Please carry medication information at all times in case of emergency situations. These are general instructions for a healthy lifestyle:    No smoking/ No tobacco products/ Avoid exposure to second hand smoke  Surgeon General's Warning:  Quitting smoking now greatly reduces serious risk to your health.     Obesity, smoking, and sedentary lifestyle greatly increases your risk for illness    A healthy diet, regular physical exercise & weight monitoring are important for maintaining a healthy lifestyle    You may be retaining fluid if you have a history of heart failure or if you experience any of the following symptoms:  Weight gain of 3 pounds or more overnight or 5 pounds in a week, increased swelling in our hands or feet or shortness of breath while lying flat in bed. Please call your doctor as soon as you notice any of these symptoms; do not wait until your next office visit. Recognize signs and symptoms of STROKE:    F-face looks uneven    A-arms unable to move or move unevenly    S-speech slurred or non-existent    T-time-call 911 as soon as signs and symptoms begin-DO NOT go       Back to bed or wait to see if you get better-TIME IS BRAIN. Warning Signs of HEART ATTACK     Call 911 if you have these symptoms:   Chest discomfort. Most heart attacks involve discomfort in the center of the chest that lasts more than a few minutes, or that goes away and comes back. It can feel like uncomfortable pressure, squeezing, fullness, or pain.  Discomfort in other areas of the upper body. Symptoms can include pain or discomfort in one or both arms, the back, neck, jaw, or stomach.  Shortness of breath with or without chest discomfort.  Other signs may include breaking out in a cold sweat, nausea, or lightheadedness. Don't wait more than five minutes to call 911 - MINUTES MATTER! Fast action can save your life. Calling 911 is almost always the fastest way to get lifesaving treatment. Emergency Medical Services staff can begin treatment when they arrive -- up to an hour sooner than if someone gets to the hospital by car. The discharge information has been reviewed with the patient. The patient verbalized understanding. Discharge medications reviewed with the patient and appropriate educational materials and side effects teaching were provided.   ___________________________________________________________________________________________________________________________________

## 2018-03-14 NOTE — PROGRESS NOTES
Ativan given by previous nurse effective. Less agitation and confusion. Resting quietly in bed with eyes closed. Will open to sound.

## 2018-03-14 NOTE — PROGRESS NOTES
Daughter with questions about why Ativan and Lidocream cream were stopped on discharge. Call placed to MD. MD unsure whether to resume awaiting call back.

## 2018-03-14 NOTE — PROGRESS NOTES
GI DAILY PROGRESS NOTE    Admit Date:  3/12/2018    Today's Date:  3/14/2018    CC: Abdominal distention ? volvulus    Subjective:     Patient is very comfortable this am -abd similar to post decompression. Tolerating clears       History of Present Illness:  Patient is a 80 y.o. female with PMH of CVA, dementia, HTN, HLD, GERD who is seen in consultation at the request of Dr. Josefina Pulido for abdominal distention with questionable volvulus. Pt had sigmoid volvulus in Feb 2018 that resolved with flex sig/decompression on 2/1/6/18. She was seen in office follow up today and distended abdomen with high pitched bowel sounds were noted. She was sent for Abdominal films showing distal colonic distention (full report below) similar to findings on CT 2/16/18. She was referred to Hospitalists for admission and surgical consultation. Per Dr. Noel Jeronimo, colonic decompression with insertion of rectal tube should precede bowel prep/ surgery. Pt is an unreliable historian but denies abdominal pain.  There has been no N/V, changes in bowel habits, hematochezia, or melena reported.         Medications:   Current Facility-Administered Medications   Medication Dose Route Frequency    lactated Ringers infusion  100 mL/hr IntraVENous CONTINUOUS    bisacodyl (DULCOLAX) suppository 10 mg  10 mg Rectal DAILY PRN    loratadine (CLARITIN) tablet 10 mg  10 mg Oral QHS    citalopram (CELEXA) tablet 20 mg  20 mg Oral QPM    divalproex DR (DEPAKOTE) tablet 125 mg  125 mg Oral TID PRN    lisinopril (PRINIVIL, ZESTRIL) tablet 10 mg  10 mg Oral DAILY    LORazepam (ATIVAN) tablet 0.5 mg  0.5 mg Oral BID PRN    memantine (NAMENDA) tablet 5 mg  5 mg Oral BID    pravastatin (PRAVACHOL) tablet 20 mg  20 mg Oral QHS    famotidine (PEPCID) tablet 40 mg  40 mg Oral DAILY    traMADol (ULTRAM) tablet 25 mg  25 mg Oral Q6H PRN    sodium chloride (NS) flush 5-10 mL  5-10 mL IntraVENous Q8H    sodium chloride (NS) flush 5-10 mL  5-10 mL IntraVENous PRN  acetaminophen (TYLENOL) tablet 650 mg  650 mg Oral Q4H PRN    ondansetron (ZOFRAN) injection 4 mg  4 mg IntraVENous Q4H PRN    0.9% sodium chloride infusion  75 mL/hr IntraVENous CONTINUOUS    haloperidol lactate (HALDOL) injection 3 mg  3 mg IntraMUSCular Q6H PRN       Review of Systems:  ROS was obtained, with pertinent positives as listed above. No chest pain or SOB. Diet:  clear    Objective:   Vitals:  Visit Vitals    /75    Pulse 76    Temp 97.9 °F (36.6 °C)    Resp 18    SpO2 94%    Breastfeeding No     Intake/Output:  03/14 0701 - 03/14 1900  In: 570 [I.V.:570]  Out: -   03/12 1901 - 03/14 0700  In: 1296 [P.O.:60; I.V.:1236]  Out: 0   Exam:  General appearance: alert, cooperative, no distress  Lungs: clear to auscultation bilaterally anteriorly  Heart: regular rate and rhythm  Abdomen: soft, non-tender. Bowel sounds normal. No masses, no organomegaly  Extremities: extremities normal, atraumatic, no cyanosis or edema  Neuro:  alert     Data Review (Labs):    Recent Labs      03/14/18   0441  03/13/18   1411  03/13/18   0536  03/12/18   2117   WBC  5.5   --   6.6  8.9   HGB  11.4*   --   13.0  14.6   HCT  34.3*   --   38.5  43.0   PLT  138*   --   142*  191   MCV  93.7   --   93.4  94.1   NA  143   --   142  139   K  2.9*  3.4*  2.8*  3.8   CL  111*   --   105  101   CO2  22   --   25  29   BUN  6*   --   10  11   CREA  0.46*   --   0.62  0.79   CA  7.9*   --   8.6  9.5   GLU  78   --   104*  121*       Assessment:     Principal Problem:    Volvulus (HCC) (3/12/2018)    Active Problems:    Weakness generalized (7/24/2012)      Dementia with behavioral disturbance (3/12/2018)      Gaseous abdominal distention (3/12/2018)    80 y.o. female with PMH of CVA, dementia, HTN,HLD, GERD who is seen in consultation at the request of Dr. Perry Wilcox for abdominal distention with questionable volvulus. Pt had sigmoid volvulus in Feb 2018 that resolved with flex sig/decompression on 2/1/6/18.  She was seen in office follow up today and distended abdomen with high pitched bowel sounds were noted. She was sent for Abdominal films showing distal colonic distention (full report above) similar to findings on CT 2/16/18. She is s/p successful colonic decompression yesterday. Family and patient wish to avoid surgery       Plan:     Advance diet to low fiber  Ok for d/c from GI standpoint after replacement of electrolytes.   If volvulus occurs again in short time frame then surgical intervention may need to be revisited    Sukumar Jarquin MD  [facility], [place]

## 2018-03-14 NOTE — PROGRESS NOTES
Pt with K+ of 2.9 and needs replacement prior to discharge according to GI notes. Primary RN to page hospitalist for orders for electrolyte replacement. Waiting for daughter to arrive prior to going over discharge instructions.

## 2018-03-15 ENCOUNTER — PATIENT OUTREACH (OUTPATIENT)
Dept: CASE MANAGEMENT | Age: 83
End: 2018-03-15

## 2018-03-15 NOTE — PROGRESS NOTES
Follow up visit. Pt was in good spirits, hoping to be discharged today.   Ministry of presence & prayer to demonstrate caring & concern, convey emotional & spiritual support

## 2018-03-15 NOTE — PROGRESS NOTES
Please note placed call and spoke with daughter who confirms that patient resides in and was d/c to [facility]. Daughter Asher Rhodes states in house physician comes to [facility] to see patient and coordinates her care with staff. No further INGRID completed. Griffin Mcdonald LPN/ Care Coordinator  [address]  [address] [facility] / [place] [address] [address] [facility]  [email or web address]. [facility] note will not be viewable in [address]

## (undated) DEVICE — SYR 3ML LL TIP 1/10ML GRAD --

## (undated) DEVICE — CANNULA NSL ORAL AD FOR CAPNOFLEX CO2 O2 AIRLFE

## (undated) DEVICE — SYR 5ML 1/5 GRAD LL NSAF LF --

## (undated) DEVICE — NDL PRT INJ NSAF BLNT 18GX1.5 --

## (undated) DEVICE — CONNECTOR TBNG OD5-7MM O2 END DISP

## (undated) DEVICE — KENDALL RADIOLUCENT FOAM MONITORING ELECTRODE RECTANGULAR SHAPE: Brand: KENDALL